# Patient Record
Sex: FEMALE | Race: WHITE | NOT HISPANIC OR LATINO | Employment: OTHER | URBAN - METROPOLITAN AREA
[De-identification: names, ages, dates, MRNs, and addresses within clinical notes are randomized per-mention and may not be internally consistent; named-entity substitution may affect disease eponyms.]

---

## 2018-12-03 ENCOUNTER — HOSPITAL ENCOUNTER (EMERGENCY)
Facility: CLINIC | Age: 83
Discharge: HOME | End: 2018-12-03
Attending: EMERGENCY MEDICINE | Admitting: EMERGENCY MEDICINE
Payer: MEDICARE

## 2018-12-03 ENCOUNTER — APPOINTMENT (OUTPATIENT)
Dept: MRI IMAGING | Facility: CLINIC | Age: 83
End: 2018-12-03
Attending: EMERGENCY MEDICINE
Payer: MEDICARE

## 2018-12-03 VITALS
BODY MASS INDEX: 17.66 KG/M2 | OXYGEN SATURATION: 96 % | HEIGHT: 65 IN | DIASTOLIC BLOOD PRESSURE: 114 MMHG | RESPIRATION RATE: 15 BRPM | SYSTOLIC BLOOD PRESSURE: 172 MMHG | WEIGHT: 106 LBS | TEMPERATURE: 98 F

## 2018-12-03 DIAGNOSIS — R20.2 TINGLING: ICD-10-CM

## 2018-12-03 DIAGNOSIS — N39.0 URINARY TRACT INFECTION WITHOUT HEMATURIA, SITE UNSPECIFIED: ICD-10-CM

## 2018-12-03 LAB
ALBUMIN UR-MCNC: 10 MG/DL
ANION GAP SERPL CALCULATED.3IONS-SCNC: 5 MMOL/L (ref 3–14)
APPEARANCE UR: CLEAR
BASOPHILS # BLD AUTO: 0 10E9/L (ref 0–0.2)
BASOPHILS NFR BLD AUTO: 0.7 %
BILIRUB UR QL STRIP: NEGATIVE
BUN SERPL-MCNC: 15 MG/DL (ref 7–30)
CALCIUM SERPL-MCNC: 8.9 MG/DL (ref 8.5–10.1)
CHLORIDE SERPL-SCNC: 107 MMOL/L (ref 94–109)
CO2 SERPL-SCNC: 28 MMOL/L (ref 20–32)
COLOR UR AUTO: YELLOW
CREAT SERPL-MCNC: 0.67 MG/DL (ref 0.52–1.04)
DIFFERENTIAL METHOD BLD: NORMAL
EOSINOPHIL # BLD AUTO: 0.1 10E9/L (ref 0–0.7)
EOSINOPHIL NFR BLD AUTO: 1.8 %
ERYTHROCYTE [DISTWIDTH] IN BLOOD BY AUTOMATED COUNT: 14 % (ref 10–15)
GFR SERPL CREATININE-BSD FRML MDRD: 84 ML/MIN/1.7M2
GLUCOSE SERPL-MCNC: 102 MG/DL (ref 70–99)
GLUCOSE UR STRIP-MCNC: NEGATIVE MG/DL
HCT VFR BLD AUTO: 38.2 % (ref 35–47)
HGB BLD-MCNC: 12.8 G/DL (ref 11.7–15.7)
HGB UR QL STRIP: NEGATIVE
IMM GRANULOCYTES # BLD: 0 10E9/L (ref 0–0.4)
IMM GRANULOCYTES NFR BLD: 0 %
INTERPRETATION ECG - MUSE: NORMAL
KETONES UR STRIP-MCNC: 10 MG/DL
LEUKOCYTE ESTERASE UR QL STRIP: ABNORMAL
LYMPHOCYTES # BLD AUTO: 1 10E9/L (ref 0.8–5.3)
LYMPHOCYTES NFR BLD AUTO: 22.9 %
MCH RBC QN AUTO: 31.1 PG (ref 26.5–33)
MCHC RBC AUTO-ENTMCNC: 33.5 G/DL (ref 31.5–36.5)
MCV RBC AUTO: 93 FL (ref 78–100)
MONOCYTES # BLD AUTO: 0.4 10E9/L (ref 0–1.3)
MONOCYTES NFR BLD AUTO: 9.3 %
MUCOUS THREADS #/AREA URNS LPF: PRESENT /LPF
NEUTROPHILS # BLD AUTO: 2.9 10E9/L (ref 1.6–8.3)
NEUTROPHILS NFR BLD AUTO: 65.3 %
NITRATE UR QL: NEGATIVE
NRBC # BLD AUTO: 0 10*3/UL
NRBC BLD AUTO-RTO: 0 /100
PH UR STRIP: 6.5 PH (ref 5–7)
PLATELET # BLD AUTO: 212 10E9/L (ref 150–450)
POTASSIUM SERPL-SCNC: 3.8 MMOL/L (ref 3.4–5.3)
RBC # BLD AUTO: 4.11 10E12/L (ref 3.8–5.2)
RBC #/AREA URNS AUTO: 8 /HPF (ref 0–2)
SODIUM SERPL-SCNC: 140 MMOL/L (ref 133–144)
SOURCE: ABNORMAL
SP GR UR STRIP: 1.02 (ref 1–1.03)
SQUAMOUS #/AREA URNS AUTO: <1 /HPF (ref 0–1)
UROBILINOGEN UR STRIP-MCNC: NORMAL MG/DL (ref 0–2)
WBC # BLD AUTO: 4.4 10E9/L (ref 4–11)
WBC #/AREA URNS AUTO: 40 /HPF (ref 0–5)

## 2018-12-03 PROCEDURE — 70553 MRI BRAIN STEM W/O & W/DYE: CPT

## 2018-12-03 PROCEDURE — A9585 GADOBUTROL INJECTION: HCPCS | Performed by: EMERGENCY MEDICINE

## 2018-12-03 PROCEDURE — 81001 URINALYSIS AUTO W/SCOPE: CPT | Performed by: EMERGENCY MEDICINE

## 2018-12-03 PROCEDURE — 93005 ELECTROCARDIOGRAM TRACING: CPT

## 2018-12-03 PROCEDURE — 85025 COMPLETE CBC W/AUTO DIFF WBC: CPT | Performed by: EMERGENCY MEDICINE

## 2018-12-03 PROCEDURE — 25500064 ZZH RX 255 OP 636: Performed by: EMERGENCY MEDICINE

## 2018-12-03 PROCEDURE — 96374 THER/PROPH/DIAG INJ IV PUSH: CPT

## 2018-12-03 PROCEDURE — 25000128 H RX IP 250 OP 636: Performed by: EMERGENCY MEDICINE

## 2018-12-03 PROCEDURE — 99285 EMERGENCY DEPT VISIT HI MDM: CPT | Mod: 25

## 2018-12-03 PROCEDURE — 80048 BASIC METABOLIC PNL TOTAL CA: CPT | Performed by: EMERGENCY MEDICINE

## 2018-12-03 RX ORDER — GADOBUTROL 604.72 MG/ML
4 INJECTION INTRAVENOUS ONCE
Status: COMPLETED | OUTPATIENT
Start: 2018-12-03 | End: 2018-12-03

## 2018-12-03 RX ORDER — CEPHALEXIN 500 MG/1
500 CAPSULE ORAL 3 TIMES DAILY
Qty: 30 CAPSULE | Refills: 0 | Status: SHIPPED | OUTPATIENT
Start: 2018-12-03 | End: 2018-12-13

## 2018-12-03 RX ORDER — LORAZEPAM 2 MG/ML
0.5 INJECTION INTRAMUSCULAR ONCE
Status: COMPLETED | OUTPATIENT
Start: 2018-12-03 | End: 2018-12-03

## 2018-12-03 RX ADMIN — LORAZEPAM 0.5 MG: 2 INJECTION INTRAMUSCULAR; INTRAVENOUS at 11:04

## 2018-12-03 RX ADMIN — GADOBUTROL 4 ML: 604.72 INJECTION INTRAVENOUS at 15:14

## 2018-12-03 ASSESSMENT — ENCOUNTER SYMPTOMS
FATIGUE: 1
NERVOUS/ANXIOUS: 1
NUMBNESS: 1

## 2018-12-03 NOTE — DISCHARGE INSTRUCTIONS
"  Paraesthesias  Paraesthesia is a burning or prickling sensation that is sometimes felt in the hands, arms, legs or feet. It can also occur in other parts of the body. It can also feel like tingling or numbness, skin crawling, or itching. The feeling is not comfortable, but it is not painful. (The \"pins and needles\" feeling that happens when a foot or hand \"falls asleep\" is a temporary paraesthesia.)  Paraesthesias that last or come and go may be caused by medical issues that need to be treated. These include stroke, a bulging disk pressing on a nerve, a trapped nerve, vitamin deficiencies, uncontrolled diabetes, alcohol abuse, or even certain medicines.  Tests are often done. These tests may include blood tests, X-ray, CT (computerized tomography) scan, nerve conduction studies (NCS), or a muscle test (electromyography). Depending on the cause, treatment may include physical therapy.  Home care    Tell your healthcare provider about all medicines you take. This includes prescription and over-the-counter medicines, vitamins, and herbs. Ask if any of the medicines may be causing your problems. Don't make any changes to prescription medicines without talking to your healthcare provider first.    You may be prescribed medicines to help relieve the tingling feeling or for pain. Take all medicines as directed.    A numb hand or foot may be more prone to injury. To help protect it:  ? Always use oven mitts.  ? Test water with an unaffected hand or foot.  ? Use caution when trimming nails. File sharp areas.  ? Wear shoes that fit well to avoid pressure points, blisters, and ulcers.  ? Inspect your hands and feet carefully (including the soles of your feet and between your toes) daily. If you see red areas, sores, or other problems, tell your healthcare provider.  Follow-up care  Follow up with your doctor, or as advised. You may need further testing or evaluation.     When to seek medical advice  Call your healthcare " provider right away if any of the following occur:    Numbness or weakness of the face, one arm, or one leg    Slurred speech, confusion, trouble speaking, walking, or seeing    Severe headache, fainting spell, dizziness, or seizure    Chest, arm, neck, or upper back pain    Loss of bladder or bowel control    Open wound with redness, swelling, or pus         Date Last Reviewed: 4/1/2018 2000-2018 [employer]. [address] [place] [address]. All rights reserved. This information is not intended as a substitute for professional medical care. Always follow your healthcare professional's instructions.

## 2018-12-03 NOTE — ED AVS SNAPSHOT
Emergency Department    [address] [place] [address]    Phone:  [phone]    Fax:  [phone]                                       Kavita Navarro   MRN: [id number]    Department:   Emergency Department   Date of Visit:  12/3/2018           After Visit Summary Signature Page     I have received my discharge instructions, and my questions have been answered. I have discussed any challenges I see with this plan with the nurse or doctor.    ..........................................................................................................................................  Patient/Patient Representative Signature      ..........................................................................................................................................  Patient Representative Print Name and Relationship to Patient    ..................................................               ................................................  Date                                   Time    ..........................................................................................................................................  Reviewed by Signature/Title    ...................................................              ..............................................  Date                                               Time          22EPIC Rev 08/18

## 2018-12-03 NOTE — ED AVS SNAPSHOT
"  Emergency Department    [address] [facility] [address]    Phone:  [phone]    Fax:  [phone]                                       Kavita Navarro   MRN: [id number]    Department:   Emergency Department   Date of Visit:  12/3/2018           Patient Information     Date Of Birth          1935        Your diagnoses for this visit were:     Urinary tract infection without hematuria, site unspecified     Tingling        You were seen by Kevin Milton MD.      Follow-up Information     Follow up with Radha Gonzales MD. Schedule an appointment as soon as possible for a visit in 3 days.    Why:  As needed, For repeat evaluation and symptom check    Contact information:    [facility]  [address]     [facility] [address]  [phone]          Follow up with  Emergency Department.    Specialty:  EMERGENCY MEDICINE    Why:  If symptoms worsen    Contact information:    [address] [facility] [address] [phone]        Discharge Instructions         Paraesthesias  Paraesthesia is a burning or prickling sensation that is sometimes felt in the hands, arms, legs or feet. It can also occur in other parts of the body. It can also feel like tingling or numbness, skin crawling, or itching. The feeling is not comfortable, but it is not painful. (The \"pins and needles\" feeling that happens when a foot or hand \"falls asleep\" is a temporary paraesthesia.)  Paraesthesias that last or come and go may be caused by medical issues that need to be treated. These include stroke, a bulging disk pressing on a nerve, a trapped nerve, vitamin deficiencies, uncontrolled diabetes, alcohol abuse, or even certain medicines.  Tests are often done. These tests may include blood tests, X-ray, CT (computerized tomography) scan, nerve conduction studies (NCS), or a muscle test (electromyography). Depending on the cause, treatment may include " physical therapy.  Home care    Tell your healthcare provider about all medicines you take. This includes prescription and over-the-counter medicines, vitamins, and herbs. Ask if any of the medicines may be causing your problems. Don't make any changes to prescription medicines without talking to your healthcare provider first.    You may be prescribed medicines to help relieve the tingling feeling or for pain. Take all medicines as directed.    A numb hand or foot may be more prone to injury. To help protect it:  ? Always use oven mitts.  ? Test water with an unaffected hand or foot.  ? Use caution when trimming nails. File sharp areas.  ? Wear shoes that fit well to avoid pressure points, blisters, and ulcers.  ? Inspect your hands and feet carefully (including the soles of your feet and between your toes) daily. If you see red areas, sores, or other problems, tell your healthcare provider.  Follow-up care  Follow up with your doctor, or as advised. You may need further testing or evaluation.     When to seek medical advice  Call your healthcare provider right away if any of the following occur:    Numbness or weakness of the face, one arm, or one leg    Slurred speech, confusion, trouble speaking, walking, or seeing    Severe headache, fainting spell, dizziness, or seizure    Chest, arm, neck, or upper back pain    Loss of bladder or bowel control    Open wound with redness, swelling, or pus         Date Last Reviewed: 4/1/2018 2000-2018 [employer]. [address] [place] [address]. All rights reserved. This information is not intended as a substitute for professional medical care. Always follow your healthcare professional's instructions.          Discharge References/Attachments     URINARY TRACT INFECTIONS IN WOMEN (ENGLISH)      24 Hour Appointment Hotline       To make an appointment at any [facility] call [phone] ([phone]). If you don't have a family doctor or  clinic, we will help you find one. [facility] are conveniently located to serve the needs of you and your family.             Review of your medicines      START taking        Dose / Directions Last dose taken    cephALEXin 500 MG capsule   Commonly known as:  KEFLEX   Dose:  500 mg   Quantity:  30 capsule        Take 1 capsule (500 mg) by mouth 3 times daily for 10 days   Refills:  0                Prescriptions were sent or printed at these locations (1 Prescription)                   Other Prescriptions                Printed at Department/Unit printer (1 of 1)         cephALEXin (KEFLEX) 500 MG capsule                Procedures and tests performed during your visit     Basic metabolic panel    CBC with platelets differential    EKG 12-lead, tracing only    MR Brain w/o & w Contrast    UA with Microscopic      Orders Needing Specimen Collection     None      Pending Results     Date and Time Order Name Status Description    12/3/2018 1051 MR Brain w/o & w Contrast Preliminary             Pending Culture Results     No orders found from 12/1/2018 to 12/4/2018.            Pending Results Instructions     If you had any lab results that were not finalized at the time of your Discharge, you can call the ED Lab Result RN at [phone]. You will be contacted by this team for any positive Lab results or changes in treatment. The nurses are available 7 days a week from 10A to 6:30P.  You can leave a message 24 hours per day and they will return your call.        Test Results From Your Hospital Stay        12/3/2018 10:47 AM      Component Results     Component Value Ref Range & Units Status    Color Urine Yellow  Final    Appearance Urine Clear  Final    Glucose Urine Negative NEG^Negative mg/dL Final    Bilirubin Urine Negative NEG^Negative Final    Ketones Urine 10 (A) NEG^Negative mg/dL Final    Specific Gravity Urine 1.018 1.003 - 1.035 Final    Blood Urine Negative NEG^Negative Final    pH Urine 6.5 5.0 -  7.0 pH Final    Protein Albumin Urine 10 (A) NEG^Negative mg/dL Final    Urobilinogen mg/dL Normal 0.0 - 2.0 mg/dL Final    Nitrite Urine Negative NEG^Negative Final    Leukocyte Esterase Urine Large (A) NEG^Negative Final    Source Midstream Urine  Final    WBC Urine 40 (H) 0 - 5 /HPF Final    RBC Urine 8 (H) 0 - 2 /HPF Final    Squamous Epithelial /HPF Urine <1 0 - 1 /HPF Final    Mucous Urine Present (A) NEG^Negative /LPF Final         12/3/2018 11:17 AM      Component Results     Component Value Ref Range & Units Status    WBC 4.4 4.0 - 11.0 10e9/L Final    RBC Count 4.11 3.8 - 5.2 10e12/L Final    Hemoglobin 12.8 11.7 - 15.7 g/dL Final    Hematocrit 38.2 35.0 - 47.0 % Final    MCV 93 78 - 100 fl Final    MCH 31.1 26.5 - 33.0 pg Final    MCHC 33.5 31.5 - 36.5 g/dL Final    RDW 14.0 10.0 - 15.0 % Final    Platelet Count 212 150 - 450 10e9/L Final    Diff Method Automated Method  Final    % Neutrophils 65.3 % Final    % Lymphocytes 22.9 % Final    % Monocytes 9.3 % Final    % Eosinophils 1.8 % Final    % Basophils 0.7 % Final    % Immature Granulocytes 0.0 % Final    Nucleated RBCs 0 0 /100 Final    Absolute Neutrophil 2.9 1.6 - 8.3 10e9/L Final    Absolute Lymphocytes 1.0 0.8 - 5.3 10e9/L Final    Absolute Monocytes 0.4 0.0 - 1.3 10e9/L Final    Absolute Eosinophils 0.1 0.0 - 0.7 10e9/L Final    Absolute Basophils 0.0 0.0 - 0.2 10e9/L Final    Abs Immature Granulocytes 0.0 0 - 0.4 10e9/L Final    Absolute Nucleated RBC 0.0  Final         12/3/2018 11:34 AM      Component Results     Component Value Ref Range & Units Status    Sodium 140 133 - 144 mmol/L Final    Potassium 3.8 3.4 - 5.3 mmol/L Final    Chloride 107 94 - 109 mmol/L Final    Carbon Dioxide 28 20 - 32 mmol/L Final    Anion Gap 5 3 - 14 mmol/L Final    Glucose 102 (H) 70 - 99 mg/dL Final    Urea Nitrogen 15 7 - 30 mg/dL Final    Creatinine 0.67 0.52 - 1.04 mg/dL Final    GFR Estimate 84 >60 mL/min/1.7m2 Final    Non  GFR Calc    GFR  Estimate If Black >90 >60 mL/min/1.7m2 Final    African American GFR Calc    Calcium 8.9 8.5 - 10.1 mg/dL Final         12/3/2018  3:22 PM      Narrative     MRI BRAIN WITHOUT AND WITH CONTRAST  12/3/2018 3:02 PM    HISTORY:  Possible TIA vs CVA several days ago. Tingling and  paresthesias in bilateral upper extremities.    TECHNIQUE:  Multiplanar, multisequence MRI of the brain without and  with 4 mL Gadavist.    COMPARISON: None.    FINDINGS: Diffusion-weighted images are normal. There is no evidence  for intracranial hemorrhage or acute infarct. There is some mild to  moderate cerebral atrophy. The brain parenchyma is otherwise within  normal limits. Vascular structures are patent at the skull base.  Postcontrast images do not show any abnormal areas of enhancement or  any focal mass lesions.        Impression     IMPRESSION:  1. Mild to moderate cerebral atrophy.  2. No evidence for intracranial hemorrhage, acute infarct, any focal  mass lesions.                Clinical Quality Measure: Blood Pressure Screening     Your blood pressure was checked while you were in the emergency department today. The last reading we obtained was  BP: 140/71 . Please read the guidelines below about what these numbers mean and what you should do about them.  If your systolic blood pressure (the top number) is less than 120 and your diastolic blood pressure (the bottom number) is less than 80, then your blood pressure is normal. There is nothing more that you need to do about it.  If your systolic blood pressure (the top number) is 120-139 or your diastolic blood pressure (the bottom number) is 80-89, your blood pressure may be higher than it should be. You should have your blood pressure rechecked within a year by a primary care provider.  If your systolic blood pressure (the top number) is 140 or greater or your diastolic blood pressure (the bottom number) is 90 or greater, you may have high blood pressure. High blood pressure is  "treatable, but if left untreated over time it can put you at risk for heart attack, stroke, or kidney failure. You should have your blood pressure rechecked by a primary care provider within the next 4 weeks.  If your provider in the emergency department today gave you specific instructions to follow-up with your doctor or provider even sooner than that, you should follow that instruction and not wait for up to 4 weeks for your follow-up visit.        Thank you for choosing [facility]       Thank you for choosing [facility] for your care. Our goal is always to provide you with excellent care. Hearing back from our patients is one way we can continue to improve our services. Please take a few minutes to complete the written survey that you may receive in the mail after you visit with us. Thank you!        [employer] Information     Whois lets you send messages to your doctor, view your test results, renew your prescriptions, schedule appointments and more. To sign up, go to [email or web address] . Click on \"Log in\" on the left side of the screen, which will take you to the Welcome page. Then click on \"Sign up Now\" on the right side of the page.     You will be asked to enter the access code listed below, as well as some personal information. Please follow the directions to create your username and password.     Your access code is: [email or web address]  Expires: 3/3/2019  3:27 PM     Your access code will  in 90 days. If you need help or a new code, please call your [facility] clinic or [phone].        Care EveryWhere ID     This is your Care EveryWhere ID. This could be used by other organizations to access your [facility] medical records  [id number]        Equal Access to Services     NAY NERI : Marilyn Gupta, enrrique campbell, elina azevedo. So [facility] [phone].    ATENCIÓN: Si habla español, tiene a davila disposición servicios gratuitos de " asistencia lingüística. Joel al [phone].    We comply with applicable federal civil rights laws and [place] laws. We do not discriminate on the basis of race, color, national origin, age, disability, sex, sexual orientation, or gender identity.            After Visit Summary       This is your record. Keep this with you and show to your community pharmacist(s) and doctor(s) at your next visit.

## 2018-12-03 NOTE — ED PROVIDER NOTES
"  History     Chief Complaint:  Tingling    HPI   Kavita Navarro is a 83 year old female with a history of cancer who presents to the ED for evaluation of tingling. The patient reports that she has had her left hand \"lock up\" intermittently over the past week. 2 days ago, she developed an onset of tingling in her right hand, in addition to tingling in the right face, so vshe presented to the ED for evaluation. Here in the ED, she does note some fatigue. She denies any other symptoms or concerns. She does state that she is very anxious about this, as her father had a stroke.    Allergies:  NKDA    Medications:    The patient is currently on no regular medications.    Past Medical History:    Cancer  Hepatitis A    Past Surgical History:    Cataract   section    Family History:    Stroke    Social History:  Former Smoker  Alcohol use: yes    Review of Systems   Constitutional: Positive for fatigue.   Neurological: Positive for numbness (tingling).   Psychiatric/Behavioral: The patient is nervous/anxious.    All other systems reviewed and are negative.      Physical Exam     Patient Vitals for the past 24 hrs:   BP Temp Temp src Heart Rate Resp SpO2 Height Weight   18 1400 140/71 - - 64 14 98 % - -   18 1300 (!) 150/91 - - 68 16 97 % - -   18 1200 (!) 160/95 - - 67 14 95 % - -   18 1054 (!) 163/102 - - 78 18 98 % - -   18 1016 (!) 159/101 98  F (36.7  C) Oral 83 16 98 % 1.651 m (5' 5\") 48.1 kg (106 lb)     Physical Exam  Vitals: reviewed by me  General: Pt seen on [facility], cooperative, and alert to conversation  Eyes: Tracking well, clear conjunctiva BL  ENT: MMM, midline trachea.   Lungs:  No tachypnea, no accessory muscle use. No respiratory distress.   CV: Rate as above, regular rhythm.    Abd: Soft, non tender, no guarding, no rebound. Non distended  MSK: no peripheral edema or joint effusion.  No evidence of trauma  Skin: No rash, normal turgor and " temperature  Neuro: Clear speech and no facial droop.  BUE and BLE with SILT and 5/5 motor  CN 2 - 12 in tact   Psych: Not RIS, no e/o AH/VH      Emergency Department Course     ECG:  Indication: Tingling  Time: 1059  Vent. Rate 75 bpm. CA interval 142. QRS duration 88. QT/QTc 406/453. P-R-T axis 84 -19 82. Normal sinus rhythm. Possible left atrial enlargement. Borderline ECG. Read time: 1100    Imaging:  Radiographic findings were communicated with the patient who voiced understanding of the findings.  MR Brain w/o and w/ contrast:   1. Mild to moderate cerebral atrophy.  2. No evidence for intracranial hemorrhage, acute infarct, any focal mass lesions, as per radiology    Laboratory:  CBC: WBC: 4.4, HGB: 12.8, PLT: 212  BMP: Glucose 102 (H), o/w WNL (Creatinine: 0.67)    UA with Microscopic: protein albumin 10 (A), ketones 10 (A), leukocyte esterase large (A), WBC 40 (H), RBC 8 (H), mucous present (A), o/w WNL    Interventions:  1104 Lorazepam, 0.5 mg, IV injection  1514 Gadavist 4 mL IV    Emergency Department Course:  Nursing notes and vitals reviewed. (2087) I performed an exam of the patient as documented above.     IV inserted. Medicine administered as documented above. Blood drawn. This was sent to the lab for further testing, results above.    The patient provided a urine sample here in the emergency department. This was sent for laboratory testing, findings above.     EKG obtained in the ED, see results above.     The patient was sent for a Brain MR while in the emergency department, findings above.     (1530) I rechecked the patient and discussed the results of her workup thus far.     Findings and plan explained to the Patient. Patient discharged home with instructions regarding supportive care, medications, and reasons to return. The importance of close follow-up was reviewed. The patient was prescribed Keflex.    I personally reviewed the laboratory results with the Patient and answered all related  questions prior to discharge.     Impression & Plan      Medical Decision Making:  Kavita Navarro is a 83 year old female who presents to the ED with paraesthesias and generalized fatigue. Reassuringly, her MRI is negative, and this was done out of caution given paraesthesias as well as her age. However, there is no evidence of CVA. She does have an abnormal UA. I do think this explains some of her fatigue and I do think this is worth treating. The patient agrees with this plan as well. Labs are otherwise unremarkable, with no indication with IV antibiotics as she should do well with Keflex. The family is okay with this plan as well (son at bedside). Very clear return to ED precautions were discussed. No other abnormalities noted at this time.    Diagnosis:    ICD-10-CM    1. Urinary tract infection without hematuria, site unspecified N39.0    2. Tingling R20.2      Disposition:  discharged to home    Discharge Medications:  New Prescriptions    CEPHALEXIN (KEFLEX) 500 MG CAPSULE    Take 1 capsule (500 mg) by mouth 3 times daily for 10 days     Scribe Disclosure:  I, Ирина Floyd, am serving as a scribe on 12/3/2018 at 10:42 AM to personally document services performed by Kevin Milton MD based on my observations and the provider's statements to me.     Ирина Floyd  12/3/2018    EMERGENCY DEPARTMENT       Kevin Milton MD  12/03/18 1669

## 2020-11-23 ENCOUNTER — APPOINTMENT (OUTPATIENT)
Dept: ULTRASOUND IMAGING | Facility: CLINIC | Age: 85
End: 2020-11-23
Attending: EMERGENCY MEDICINE
Payer: MEDICARE

## 2020-11-23 ENCOUNTER — HOSPITAL ENCOUNTER (EMERGENCY)
Facility: CLINIC | Age: 85
Discharge: HOME | End: 2020-11-23
Attending: EMERGENCY MEDICINE | Admitting: EMERGENCY MEDICINE
Payer: MEDICARE

## 2020-11-23 VITALS
SYSTOLIC BLOOD PRESSURE: 155 MMHG | BODY MASS INDEX: 16.66 KG/M2 | OXYGEN SATURATION: 98 % | WEIGHT: 100 LBS | DIASTOLIC BLOOD PRESSURE: 103 MMHG | HEART RATE: 72 BPM | RESPIRATION RATE: 20 BRPM | TEMPERATURE: 98 F | HEIGHT: 65 IN

## 2020-11-23 DIAGNOSIS — R26.2 TROUBLE WALKING: ICD-10-CM

## 2020-11-23 DIAGNOSIS — R53.81 PHYSICAL DECONDITIONING: ICD-10-CM

## 2020-11-23 LAB
ALBUMIN SERPL-MCNC: 3.4 G/DL (ref 3.4–5)
ALP SERPL-CCNC: 62 U/L (ref 40–150)
ALT SERPL W P-5'-P-CCNC: 23 U/L (ref 0–50)
ANION GAP SERPL CALCULATED.3IONS-SCNC: 5 MMOL/L (ref 3–14)
AST SERPL W P-5'-P-CCNC: 24 U/L (ref 0–45)
BASOPHILS # BLD AUTO: 0 10E9/L (ref 0–0.2)
BASOPHILS NFR BLD AUTO: 0.5 %
BILIRUB SERPL-MCNC: 0.2 MG/DL (ref 0.2–1.3)
BUN SERPL-MCNC: 17 MG/DL (ref 7–30)
CALCIUM SERPL-MCNC: 9 MG/DL (ref 8.5–10.1)
CHLORIDE SERPL-SCNC: 106 MMOL/L (ref 94–109)
CO2 SERPL-SCNC: 26 MMOL/L (ref 20–32)
CREAT SERPL-MCNC: 0.67 MG/DL (ref 0.52–1.04)
DIFFERENTIAL METHOD BLD: NORMAL
EOSINOPHIL # BLD AUTO: 0 10E9/L (ref 0–0.7)
EOSINOPHIL NFR BLD AUTO: 0.3 %
ERYTHROCYTE [DISTWIDTH] IN BLOOD BY AUTOMATED COUNT: 13.8 % (ref 10–15)
GFR SERPL CREATININE-BSD FRML MDRD: 81 ML/MIN/{1.73_M2}
GLUCOSE SERPL-MCNC: 169 MG/DL (ref 70–99)
HCT VFR BLD AUTO: 38.3 % (ref 35–47)
HGB BLD-MCNC: 13 G/DL (ref 11.7–15.7)
IMM GRANULOCYTES # BLD: 0 10E9/L (ref 0–0.4)
IMM GRANULOCYTES NFR BLD: 0 %
INTERPRETATION ECG - MUSE: NORMAL
LACTATE BLD-SCNC: 1.9 MMOL/L (ref 0.7–2)
LYMPHOCYTES # BLD AUTO: 1 10E9/L (ref 0.8–5.3)
LYMPHOCYTES NFR BLD AUTO: 16.4 %
MCH RBC QN AUTO: 32.2 PG (ref 26.5–33)
MCHC RBC AUTO-ENTMCNC: 33.9 G/DL (ref 31.5–36.5)
MCV RBC AUTO: 95 FL (ref 78–100)
MONOCYTES # BLD AUTO: 0.6 10E9/L (ref 0–1.3)
MONOCYTES NFR BLD AUTO: 9.5 %
NEUTROPHILS # BLD AUTO: 4.4 10E9/L (ref 1.6–8.3)
NEUTROPHILS NFR BLD AUTO: 73.3 %
NRBC # BLD AUTO: 0 10*3/UL
NRBC BLD AUTO-RTO: 0 /100
PLATELET # BLD AUTO: 249 10E9/L (ref 150–450)
POTASSIUM SERPL-SCNC: 3.9 MMOL/L (ref 3.4–5.3)
PROT SERPL-MCNC: 7.1 G/DL (ref 6.8–8.8)
RBC # BLD AUTO: 4.04 10E12/L (ref 3.8–5.2)
SODIUM SERPL-SCNC: 137 MMOL/L (ref 133–144)
TROPONIN I SERPL-MCNC: <0.015 UG/L (ref 0–0.04)
WBC # BLD AUTO: 6 10E9/L (ref 4–11)

## 2020-11-23 PROCEDURE — 84484 ASSAY OF TROPONIN QUANT: CPT | Performed by: EMERGENCY MEDICINE

## 2020-11-23 PROCEDURE — 96360 HYDRATION IV INFUSION INIT: CPT

## 2020-11-23 PROCEDURE — 93005 ELECTROCARDIOGRAM TRACING: CPT

## 2020-11-23 PROCEDURE — 80053 COMPREHEN METABOLIC PANEL: CPT | Performed by: EMERGENCY MEDICINE

## 2020-11-23 PROCEDURE — 83605 ASSAY OF LACTIC ACID: CPT | Performed by: EMERGENCY MEDICINE

## 2020-11-23 PROCEDURE — 85025 COMPLETE CBC W/AUTO DIFF WBC: CPT | Performed by: EMERGENCY MEDICINE

## 2020-11-23 PROCEDURE — 93922 UPR/L XTREMITY ART 2 LEVELS: CPT | Mod: GZ

## 2020-11-23 PROCEDURE — 258N000003 HC RX IP 258 OP 636: Performed by: EMERGENCY MEDICINE

## 2020-11-23 PROCEDURE — 93925 LOWER EXTREMITY STUDY: CPT

## 2020-11-23 PROCEDURE — 99285 EMERGENCY DEPT VISIT HI MDM: CPT | Mod: 25

## 2020-11-23 RX ADMIN — SODIUM CHLORIDE 1000 ML: 9 INJECTION, SOLUTION INTRAVENOUS at 11:16

## 2020-11-23 ASSESSMENT — ENCOUNTER SYMPTOMS
WEAKNESS: 1
BACK PAIN: 0

## 2020-11-23 ASSESSMENT — MIFFLIN-ST. JEOR: SCORE: 904.48

## 2020-11-23 NOTE — ED AVS SNAPSHOT
[facility] Emergency Dept  [address] [facility] [address]  Phone: [phone]  Fax: [phone]                                    Kavita Navarro   MRN: [id number]    Department: [facility] Emergency Dept   Date of Visit: 11/23/2020           After Visit Summary Signature Page    I have received my discharge instructions, and my questions have been answered. I have discussed any challenges I see with this plan with the nurse or doctor.    ..........................................................................................................................................  Patient/Patient Representative Signature      ..........................................................................................................................................  Patient Representative Print Name and Relationship to Patient    ..................................................               ................................................  Date                                   Time    ..........................................................................................................................................  Reviewed by Signature/Title    ...................................................              ..............................................  Date                                               Time          22EPIC Rev 08/18

## 2020-11-23 NOTE — DISCHARGE INSTRUCTIONS
As we discussed, I do think that part of the reason you are having trouble walking is because you are exercising less.  Please do try and do additional exercises around the house, and it may be worth mentioning to your family that you miss having a treadmill accessible.  Come back to the emergency room with any other concerns.

## 2020-11-23 NOTE — ED PROVIDER NOTES
"  History     Chief Complaint:  Gait Problem    The history is provided by the patient.      Kavita Navarro is a 85 year old female who presents with a gait problem. For several months, she has had right leg weakness. For several weeks, she endorses left leg weakness. Last night she felt more weak than usual so she did some mild exercise and stretched which mildly relieved her symptoms. Last nigh at approximately 0200, she woke up to go to the bathroom when she noticed leg weakness and shaking.     Allergies:  Latex  Niacin     Medications:    Miralax  Lipitor    Past Medical History:  Generalized Anxiety    Skin Cancer  Breast Cancer  Hepatitis A  Urinary Inconstancy  FELTON  Osteopenia  Hyperlipidemia     Past Surgical History:     x2  Cataract Removal   Patellar Bursectomy  EGD  Dilation and Curettage  Breast Lumpectomy  Blepharoplasty     Family History:    Father - Stoke  Brother - Diabetes   Sister - Diabetes, Lung Cancer, Alzheimers    Social History:  The patient was unaccompanied to the ED.  Smoking Status: Former Smoker  Smokeless Tobacco: Never Used  Alcohol Use: Positive - Occasionally   Marital Status:        Review of Systems   Musculoskeletal: Negative for back pain.   Neurological: Positive for weakness (Bilateral Leg Weakness).   All other systems reviewed and are negative.      Physical Exam     Patient Vitals for the past 24 hrs:   BP Temp Temp src Pulse Resp SpO2 Height Weight   20 1340 (!) 155/103 -- -- 72 20 98 % -- --   20 1019 (!) 158/87 98  F (36.7  C) Oral 104 20 97 % 1.651 m (5' 5\") 45.4 kg (100 lb)       Physical Exam  Vitals: reviewed by me  General: Pt seen on [facility], cooperative, and alert to conversation, slightly anxious.  Eyes: Tracking well, clear conjunctiva BL  ENT: MMM, midline trachea.   Lungs:   No tachypnea, no accessory muscle use. No respiratory distress.   CV: Rate as above, regular rhythm.    Abd: Soft, non tender, no " guarding, no rebound. Non distended  MSK: no peripheral edema or joint effusion.  No evidence of trauma  Bilateral lower extremities are warm and well perfused with robust pulses.  Skin: No rash, normal turgor and temperature  Neuro: Clear speech and no facial droop.  Bilateral lower extremities with sensation intact light touch and 5 out of 5 motor throughout.,  Specifically to hip flexion hip extension knee extension knee flexion dorsiflexion plantarflexion.  Psych: Not RIS, no e/o AH/VH      Emergency Department Course     ECG:  ECG taken at 1122, ECG read at 1129  Sinus rhythm with occasional premature ventricular complexes   Otherwise normal ECG   Rate 82 bpm. AL interval 146. QRS duration 88. QT/QTc 376/439. P-R-T axes 75 2 81.    Imaging:  Radiology findings were communicated with the patient who voiced understanding of the findings.    US PRIYA Doppler No Exercise:  Ankle-brachial indices are within normal limits. Findings  do not indicate significant lower extremity arterial insufficiency.  Reading per radiology    US Lower Extremity Arterial Duplex Bilateral:  No evidence for significant steno-occlusive disease  involving the infrainguinal arteries of the lower extremities.  Reading per radiology    Laboratory:  Laboratory findings were communicated with the patient who voiced understanding of the findings.    CBC: WBC 6.0, HGB 13.0,   CMP: Glucose 169 (H), o/w WNL (Creatinine: 0.67)    Lactic Acid: 1.9  Troponin (Collected 1114): <0.015     Interventions:  1116 0.9% NaCl bolus 1000 mL IV    Emergency Department Course:  Past medical records, nursing notes, and vitals reviewed.    1048 I performed an exam of the patient as documented above.     EKG obtained in the ED, see results above.   IV was inserted and blood was drawn for laboratory testing, results above.    The patient was sent for a US while in the emergency department, results above.     1308 I rechecked the patient and discussed the results  of her workup thus far.     Findings and plan explained to the Patient. Patient discharged home with instructions regarding supportive care, medications, and reasons to return. The importance of close follow-up was reviewed.     I personally reviewed the laboratory and imaging results with the Patient and answered all related questions prior to discharge.     Impression & Plan     Medical Decision Making:   Kavita Navarro is a 85 year old female who presents to the emergency room with a sensation that her legs are cold and weak for the last several weeks. I did reveal her previous medical visits and she was due to get an ultrasound done of her arterial flow for inflow disease of her bilateral lower extremities and have been able to do this here in the ER and shows no evidence of PAD or inflow disease. She has robust strength on my exam. She can ambulate strongly in the emergency room. After discussion I do believe that deconditioning is also playing a significant role in the patient's unsteadiness on her feet. She tells me that before the winter turned cold, and before corona virus got to be severe enough to keep away from the gym she was walking for about an hour a day every day, and now for the last several weeks she has not been walking nearly as much, not even leaving the home except to leave for the grocery store once a week. We talked about potential remedies for this. I do not think she has had a stroke, she has no shooting pain that would evidence spinal issue or rediculopathy, and has no back discomfort to speak of.  No skin abnormalities, no evidence of cellulitis or trauma. Patient feels comfortable going home with the above mgmt.   Diagnosis:    ICD-10-CM    1. Trouble walking  R26.2    2. Physical deconditioning  R53.81        Disposition:  Discharged to home.    Scribe Disclosure:  I, Greyson Peters, am serving as a scribe at 10:43 AM on 11/23/2020 to document services personally performed by  Kevin Milton* based on my observations and the provider's statements to me.        Kevin Milton MD  11/23/20 4721

## 2021-05-25 ENCOUNTER — ANCILLARY PROCEDURE (OUTPATIENT)
Dept: MAMMOGRAPHY | Facility: CLINIC | Age: 86
End: 2021-05-25
Attending: INTERNAL MEDICINE
Payer: MEDICARE

## 2021-05-25 DIAGNOSIS — Z12.31 VISIT FOR SCREENING MAMMOGRAM: ICD-10-CM

## 2021-05-25 PROCEDURE — 77067 SCR MAMMO BI INCL CAD: CPT | Mod: TC | Performed by: RADIOLOGY

## 2021-05-25 PROCEDURE — 77063 BREAST TOMOSYNTHESIS BI: CPT | Mod: TC | Performed by: RADIOLOGY

## 2022-04-22 ENCOUNTER — HOSPITAL ENCOUNTER (OUTPATIENT)
Facility: CLINIC | Age: 87
Setting detail: OBSERVATION
Discharge: HOME | End: 2022-04-23
Attending: EMERGENCY MEDICINE | Admitting: INTERNAL MEDICINE
Payer: MEDICARE

## 2022-04-22 ENCOUNTER — APPOINTMENT (OUTPATIENT)
Dept: CT IMAGING | Facility: CLINIC | Age: 87
End: 2022-04-22
Attending: EMERGENCY MEDICINE
Payer: MEDICARE

## 2022-04-22 DIAGNOSIS — R93.89 ABNORMAL CT OF THE CHEST: ICD-10-CM

## 2022-04-22 DIAGNOSIS — I26.99 OTHER ACUTE PULMONARY EMBOLISM WITHOUT ACUTE COR PULMONALE (H): ICD-10-CM

## 2022-04-22 LAB
ALBUMIN SERPL-MCNC: 3.3 G/DL (ref 3.4–5)
ALP SERPL-CCNC: 77 U/L (ref 40–150)
ALT SERPL W P-5'-P-CCNC: 29 U/L (ref 0–50)
ANION GAP SERPL CALCULATED.3IONS-SCNC: 4 MMOL/L (ref 3–14)
AST SERPL W P-5'-P-CCNC: 26 U/L (ref 0–45)
ATRIAL RATE - MUSE: 250 BPM
ATRIAL RATE - MUSE: 75 BPM
BASOPHILS # BLD AUTO: 0.1 10E3/UL (ref 0–0.2)
BASOPHILS NFR BLD AUTO: 1 %
BILIRUB SERPL-MCNC: 0.5 MG/DL (ref 0.2–1.3)
BUN SERPL-MCNC: 17 MG/DL (ref 7–30)
CALCIUM SERPL-MCNC: 9.4 MG/DL (ref 8.5–10.1)
CHLORIDE BLD-SCNC: 108 MMOL/L (ref 94–109)
CO2 SERPL-SCNC: 28 MMOL/L (ref 20–32)
CREAT SERPL-MCNC: 0.73 MG/DL (ref 0.52–1.04)
D DIMER PPP FEU-MCNC: 1.33 UG/ML FEU (ref 0–0.5)
DIASTOLIC BLOOD PRESSURE - MUSE: NORMAL MMHG
DIASTOLIC BLOOD PRESSURE - MUSE: NORMAL MMHG
EOSINOPHIL # BLD AUTO: 0.1 10E3/UL (ref 0–0.7)
EOSINOPHIL NFR BLD AUTO: 1 %
ERYTHROCYTE [DISTWIDTH] IN BLOOD BY AUTOMATED COUNT: 14 % (ref 10–15)
GFR SERPL CREATININE-BSD FRML MDRD: 80 ML/MIN/1.73M2
GLUCOSE BLD-MCNC: 97 MG/DL (ref 70–99)
HCT VFR BLD AUTO: 38.6 % (ref 35–47)
HGB BLD-MCNC: 12.2 G/DL (ref 11.7–15.7)
IMM GRANULOCYTES # BLD: 0 10E3/UL
IMM GRANULOCYTES NFR BLD: 0 %
INTERPRETATION ECG - MUSE: NORMAL
INTERPRETATION ECG - MUSE: NORMAL
LIPASE SERPL-CCNC: 73 U/L (ref 73–393)
LYMPHOCYTES # BLD AUTO: 1.4 10E3/UL (ref 0.8–5.3)
LYMPHOCYTES NFR BLD AUTO: 18 %
MCH RBC QN AUTO: 31.1 PG (ref 26.5–33)
MCHC RBC AUTO-ENTMCNC: 31.6 G/DL (ref 31.5–36.5)
MCV RBC AUTO: 99 FL (ref 78–100)
MONOCYTES # BLD AUTO: 0.9 10E3/UL (ref 0–1.3)
MONOCYTES NFR BLD AUTO: 11 %
NEUTROPHILS # BLD AUTO: 5.3 10E3/UL (ref 1.6–8.3)
NEUTROPHILS NFR BLD AUTO: 69 %
NRBC # BLD AUTO: 0 10E3/UL
NRBC BLD AUTO-RTO: 0 /100
P AXIS - MUSE: 81 DEGREES
P AXIS - MUSE: 86 DEGREES
PLATELET # BLD AUTO: 235 10E3/UL (ref 150–450)
POTASSIUM BLD-SCNC: 3.9 MMOL/L (ref 3.4–5.3)
PR INTERVAL - MUSE: 146 MS
PR INTERVAL - MUSE: NORMAL MS
PROCALCITONIN SERPL-MCNC: <0.05 NG/ML
PROT SERPL-MCNC: 7.5 G/DL (ref 6.8–8.8)
QRS DURATION - MUSE: 74 MS
QRS DURATION - MUSE: 86 MS
QT - MUSE: 402 MS
QT - MUSE: 412 MS
QTC - MUSE: 431 MS
QTC - MUSE: 448 MS
R AXIS - MUSE: 2 DEGREES
R AXIS - MUSE: 46 DEGREES
RADIOLOGIST FLAGS: ABNORMAL
RBC # BLD AUTO: 3.92 10E6/UL (ref 3.8–5.2)
SARS-COV-2 RNA RESP QL NAA+PROBE: NEGATIVE
SODIUM SERPL-SCNC: 140 MMOL/L (ref 133–144)
SYSTOLIC BLOOD PRESSURE - MUSE: NORMAL MMHG
SYSTOLIC BLOOD PRESSURE - MUSE: NORMAL MMHG
T AXIS - MUSE: 68 DEGREES
T AXIS - MUSE: 77 DEGREES
TROPONIN I SERPL HS-MCNC: 6 NG/L
VENTRICULAR RATE- MUSE: 66 BPM
VENTRICULAR RATE- MUSE: 75 BPM
WBC # BLD AUTO: 7.8 10E3/UL (ref 4–11)

## 2022-04-22 PROCEDURE — 96374 THER/PROPH/DIAG INJ IV PUSH: CPT

## 2022-04-22 PROCEDURE — 85379 FIBRIN DEGRADATION QUANT: CPT | Performed by: EMERGENCY MEDICINE

## 2022-04-22 PROCEDURE — 99220 PR INITIAL OBSERVATION CARE,LEVEL III: CPT | Performed by: INTERNAL MEDICINE

## 2022-04-22 PROCEDURE — 99285 EMERGENCY DEPT VISIT HI MDM: CPT | Mod: 25

## 2022-04-22 PROCEDURE — 250N000009 HC RX 250: Performed by: EMERGENCY MEDICINE

## 2022-04-22 PROCEDURE — 250N000011 HC RX IP 250 OP 636: Performed by: EMERGENCY MEDICINE

## 2022-04-22 PROCEDURE — U0005 INFEC AGEN DETEC AMPLI PROBE: HCPCS | Performed by: EMERGENCY MEDICINE

## 2022-04-22 PROCEDURE — 96372 THER/PROPH/DIAG INJ SC/IM: CPT | Performed by: EMERGENCY MEDICINE

## 2022-04-22 PROCEDURE — G0378 HOSPITAL OBSERVATION PER HR: HCPCS

## 2022-04-22 PROCEDURE — 84145 PROCALCITONIN (PCT): CPT | Performed by: EMERGENCY MEDICINE

## 2022-04-22 PROCEDURE — 93005 ELECTROCARDIOGRAM TRACING: CPT | Mod: 76

## 2022-04-22 PROCEDURE — 250N000013 HC RX MED GY IP 250 OP 250 PS 637: Performed by: EMERGENCY MEDICINE

## 2022-04-22 PROCEDURE — 80053 COMPREHEN METABOLIC PANEL: CPT | Performed by: EMERGENCY MEDICINE

## 2022-04-22 PROCEDURE — C9803 HOPD COVID-19 SPEC COLLECT: HCPCS

## 2022-04-22 PROCEDURE — 85025 COMPLETE CBC W/AUTO DIFF WBC: CPT | Performed by: EMERGENCY MEDICINE

## 2022-04-22 PROCEDURE — 84484 ASSAY OF TROPONIN QUANT: CPT | Performed by: EMERGENCY MEDICINE

## 2022-04-22 PROCEDURE — 83690 ASSAY OF LIPASE: CPT | Performed by: EMERGENCY MEDICINE

## 2022-04-22 PROCEDURE — 71275 CT ANGIOGRAPHY CHEST: CPT

## 2022-04-22 PROCEDURE — 36415 COLL VENOUS BLD VENIPUNCTURE: CPT | Performed by: EMERGENCY MEDICINE

## 2022-04-22 PROCEDURE — 250N000013 HC RX MED GY IP 250 OP 250 PS 637: Performed by: INTERNAL MEDICINE

## 2022-04-22 PROCEDURE — 93005 ELECTROCARDIOGRAM TRACING: CPT

## 2022-04-22 RX ORDER — NALOXONE HYDROCHLORIDE 0.4 MG/ML
0.4 INJECTION, SOLUTION INTRAMUSCULAR; INTRAVENOUS; SUBCUTANEOUS
Status: DISCONTINUED | OUTPATIENT
Start: 2022-04-22 | End: 2022-04-23 | Stop reason: HOSPADM

## 2022-04-22 RX ORDER — BISACODYL 10 MG
10 SUPPOSITORY, RECTAL RECTAL DAILY PRN
Status: DISCONTINUED | OUTPATIENT
Start: 2022-04-22 | End: 2022-04-23 | Stop reason: HOSPADM

## 2022-04-22 RX ORDER — BUSPIRONE HYDROCHLORIDE 5 MG/1
5 TABLET ORAL 2 TIMES DAILY
COMMUNITY

## 2022-04-22 RX ORDER — NALOXONE HYDROCHLORIDE 0.4 MG/ML
0.2 INJECTION, SOLUTION INTRAMUSCULAR; INTRAVENOUS; SUBCUTANEOUS
Status: DISCONTINUED | OUTPATIENT
Start: 2022-04-22 | End: 2022-04-23 | Stop reason: HOSPADM

## 2022-04-22 RX ORDER — ENOXAPARIN SODIUM 100 MG/ML
0.75 INJECTION SUBCUTANEOUS EVERY 12 HOURS
Status: DISCONTINUED | OUTPATIENT
Start: 2022-04-23 | End: 2022-04-23 | Stop reason: HOSPADM

## 2022-04-22 RX ORDER — ONDANSETRON 2 MG/ML
4 INJECTION INTRAMUSCULAR; INTRAVENOUS EVERY 6 HOURS PRN
Status: DISCONTINUED | OUTPATIENT
Start: 2022-04-22 | End: 2022-04-23 | Stop reason: HOSPADM

## 2022-04-22 RX ORDER — ONDANSETRON 4 MG/1
4 TABLET, ORALLY DISINTEGRATING ORAL EVERY 6 HOURS PRN
Status: DISCONTINUED | OUTPATIENT
Start: 2022-04-22 | End: 2022-04-23 | Stop reason: HOSPADM

## 2022-04-22 RX ORDER — TRIAMCINOLONE ACETONIDE 1 MG/G
OINTMENT TOPICAL 2 TIMES DAILY PRN
COMMUNITY

## 2022-04-22 RX ORDER — MULTIVITAMIN,THERAPEUTIC
1 TABLET ORAL DAILY
COMMUNITY

## 2022-04-22 RX ORDER — BUSPIRONE HYDROCHLORIDE 5 MG/1
5 TABLET ORAL 2 TIMES DAILY
Status: DISCONTINUED | OUTPATIENT
Start: 2022-04-22 | End: 2022-04-23 | Stop reason: HOSPADM

## 2022-04-22 RX ORDER — ENOXAPARIN SODIUM 100 MG/ML
30 INJECTION SUBCUTANEOUS ONCE
Status: COMPLETED | OUTPATIENT
Start: 2022-04-22 | End: 2022-04-22

## 2022-04-22 RX ORDER — MIRTAZAPINE 15 MG/1
15 TABLET, FILM COATED ORAL AT BEDTIME
Status: DISCONTINUED | OUTPATIENT
Start: 2022-04-22 | End: 2022-04-23 | Stop reason: HOSPADM

## 2022-04-22 RX ORDER — FENTANYL CITRATE 50 UG/ML
50 INJECTION, SOLUTION INTRAMUSCULAR; INTRAVENOUS ONCE
Status: COMPLETED | OUTPATIENT
Start: 2022-04-22 | End: 2022-04-22

## 2022-04-22 RX ORDER — CHLORAL HYDRATE 500 MG
2 CAPSULE ORAL DAILY
COMMUNITY

## 2022-04-22 RX ORDER — CALCIUM CARBONATE 500(1250)
1 TABLET ORAL DAILY
COMMUNITY

## 2022-04-22 RX ORDER — POLYETHYLENE GLYCOL 3350 17 G/17G
17 POWDER, FOR SOLUTION ORAL DAILY PRN
Status: DISCONTINUED | OUTPATIENT
Start: 2022-04-22 | End: 2022-04-23 | Stop reason: HOSPADM

## 2022-04-22 RX ORDER — IOPAMIDOL 755 MG/ML
45 INJECTION, SOLUTION INTRAVASCULAR ONCE
Status: COMPLETED | OUTPATIENT
Start: 2022-04-22 | End: 2022-04-22

## 2022-04-22 RX ORDER — ACETAMINOPHEN 500 MG
1000 TABLET ORAL ONCE
Status: COMPLETED | OUTPATIENT
Start: 2022-04-22 | End: 2022-04-22

## 2022-04-22 RX ORDER — ACETAMINOPHEN 325 MG/1
650 TABLET ORAL EVERY 6 HOURS PRN
Status: DISCONTINUED | OUTPATIENT
Start: 2022-04-22 | End: 2022-04-23 | Stop reason: HOSPADM

## 2022-04-22 RX ORDER — MIRTAZAPINE 15 MG/1
15 TABLET, FILM COATED ORAL AT BEDTIME
COMMUNITY

## 2022-04-22 RX ORDER — PROCHLORPERAZINE MALEATE 5 MG
5 TABLET ORAL EVERY 6 HOURS PRN
Status: DISCONTINUED | OUTPATIENT
Start: 2022-04-22 | End: 2022-04-23 | Stop reason: HOSPADM

## 2022-04-22 RX ORDER — ENOXAPARIN SODIUM 100 MG/ML
1 INJECTION SUBCUTANEOUS ONCE
Status: DISCONTINUED | OUTPATIENT
Start: 2022-04-22 | End: 2022-04-22

## 2022-04-22 RX ORDER — ACETAMINOPHEN 650 MG/1
650 SUPPOSITORY RECTAL EVERY 6 HOURS PRN
Status: DISCONTINUED | OUTPATIENT
Start: 2022-04-22 | End: 2022-04-23 | Stop reason: HOSPADM

## 2022-04-22 RX ORDER — PROCHLORPERAZINE 25 MG
12.5 SUPPOSITORY, RECTAL RECTAL EVERY 12 HOURS PRN
Status: DISCONTINUED | OUTPATIENT
Start: 2022-04-22 | End: 2022-04-23 | Stop reason: HOSPADM

## 2022-04-22 RX ORDER — HYDROXYZINE HYDROCHLORIDE 10 MG/1
10 TABLET, FILM COATED ORAL DAILY PRN
COMMUNITY

## 2022-04-22 RX ADMIN — ACETAMINOPHEN 650 MG: 325 TABLET ORAL at 17:06

## 2022-04-22 RX ADMIN — ENOXAPARIN SODIUM 30 MG: 30 INJECTION SUBCUTANEOUS at 13:57

## 2022-04-22 RX ADMIN — ACETAMINOPHEN 1000 MG: 500 TABLET, FILM COATED ORAL at 10:24

## 2022-04-22 RX ADMIN — OXYCODONE HYDROCHLORIDE 2.5 MG: 5 TABLET ORAL at 20:17

## 2022-04-22 RX ADMIN — FENTANYL CITRATE 50 MCG: 50 INJECTION, SOLUTION INTRAMUSCULAR; INTRAVENOUS at 13:57

## 2022-04-22 RX ADMIN — MIRTAZAPINE 15 MG: 15 TABLET, FILM COATED ORAL at 21:18

## 2022-04-22 RX ADMIN — IOPAMIDOL 45 ML: 755 INJECTION, SOLUTION INTRAVENOUS at 11:34

## 2022-04-22 RX ADMIN — BUSPIRONE HYDROCHLORIDE 5 MG: 5 TABLET ORAL at 20:17

## 2022-04-22 RX ADMIN — SODIUM CHLORIDE 75 ML: 9 INJECTION, SOLUTION INTRAVENOUS at 11:34

## 2022-04-22 NOTE — PROVIDER NOTIFICATION
MD Notification    Notified Person: MD    Notified Person Name: Dr. Ramirez    Notification Date/Time: 4-22 1836    Notification Interaction: web page    Purpose of Notification: Pt having chest pain rated 5 rt pulm emboli. Pt req additional pain meds. Tylenol given. Please order if approp.    Orders Received:    Comments:

## 2022-04-22 NOTE — PROGRESS NOTES
Observation goals  PRIOR TO DISCHARGE        Comments:   -diagnostic tests and consults completed and resulted not met  -vital signs normal or at patient baseline soft bp  -safe disposition plan has been identified not met  Nurse to notify provider when observation goals have been met and patient is ready for discharge.

## 2022-04-22 NOTE — ED TRIAGE NOTES
Right sided chest pain - sharp in nature - started yesterday afternoon  awoke her again at 0100 intermit with sharp pain got concern this morning at 0700 called daughter and came to the ER

## 2022-04-22 NOTE — H&P
[facility]    History and Physical - Hospitalist Service       Date of Admission:  4/22/2022    Assessment & Plan   Kavita Navarro is a 86 year-old female with history of tobacco use, Alzheimer's type dementia, anxiety and depression who presents with right sided chest pain. Admitted on 4/22/2022 after found to have PE.     Bilateral pulmonary emboli, subsegmental  *Presents with right sided pleuritic chest pain  *CT with small subsegmental pulmonary embolus in each upper lobe  *Current invasive squamous cell carcinoma of skin, in process of additional evaluation through dermatology; if more extensive involvement than currently known may be increasing  *Otherwise no clear provoking factor other than advanced age.   - Continue enoxaparin subcutaneous for now  - Pharmacy liaison consulted for DOAC coverage, can transition 4/23/22 once coverage is known  - Telemetry overnight  - Consider indefinite anticoagulation in absence of contraindication, ultimately will defer to PCP    Bronchiectasis with mucous plugging  Hx of smoking. No known lung disease. Associated peribronchial infiltrate on imaging, but no clinical evidence of pneumonia on labs or by symptoms and procalcitonin undetectable.   - No antibiotics indicated  - No intervention needed in absence of symptoms  - May consider outpatient PFTs once PE has resolved     Squamous cell carcinoma of the skin, invasive  Follows with dermatology. Planning for additional excision/biopsy.   - Continue outpatient follow-up     Anxiety and depression  - Continue prior to admission mirtazapine, buspirone    Alzheimer's type dementia  Mild per outside record.  - Re-orient as needed  - Maintain normal day/night, sleep wake cycles  - Minimize sedating/altering medications as able  - Treat separate conditions as detailed above/below        Diet:   Regular diet   DVT Prophylaxis: Enoxaparin   Richard Catheter: Not present  Code Status:    DNR/DNI    Disposition Plan   [place] to observation status. Anticipate discharge within 24 hours if clinically improved.     Entered: Michael Russ MD 04/22/2022, 2:16 PM     The patient's care was discussed with the patient and patient's daughter    Clinically Significant Risk Factors Present on Admission             # Hypoalbuminemia: Albumin = 3.3 g/dL (Ref range: 3.4 - 5.0 g/dL) on admission, will monitor as appropriate               Michael Russ MD  [facility]    ______________________________________________________________________    Chief Complaint   Right sided chest pain for 2 days    History of Present Illness   Kavita Navarro is a 86 year old female who presents with the above chief complaint.    History is obtained from the patient and patient's daughter. The patient reports the day prior to admission she felt acute onset of right sided chest pain, worse with deep breathing. She went about her day as usual, and when the pain persisted into the following morning, she called her daughter who brought her into the Emergency Department for further evaluation.     In the Emergency Department, the patient was seen by Dr. Hutchins, with whom I discussed the patient's presenting symptoms and emergency department course.  Initial vital signs were a temperature of 98.4F, HR 53, /85, RR 20, SpO2 95% on room air. Work-up included CT PE protocol demonstrating small subsegmental pulmonary embolus in each upper lobe, bronchiectasis with mucous plugging. Hospitalists were contacted for admission to the hospital.     She denies any prior history of blood clot.  Denies any family history of clotting disorder.  Denies any recent travel, surgery.  She is currently undergoing work-up and treatment for squamous cell skin carcinoma with plan for additional biopsy/excision due to positive margins.  She has a history of breast cancer which is considered cured after lumpectomy and  radiation.  She reports that she remained up to date on her colonoscopy and Pap smear until they were no longer recommended.  She denies any blood in her stools, blood in her urine. She reports her right leg has been slightly more swollen than usual. She denies any cough, fevers, chills.     Review of Systems    Complete 10 point review of systems assessed and negative except as noted in HPI.    Past Medical History    I have reviewed this patient's medical history and updated it with pertinent information if needed.   Past Medical History:   Diagnosis Date     Alzheimer's type dementia (H)      Breast cancer (H)      Depression with anxiety      Hepatitis A      Hyperlipidemia      Obstructive sleep apnea syndrome      Osteopenia      Squamous cell skin cancer      Urinary incontinence        Past Surgical History   I have reviewed this patient's surgical history and updated it with pertinent information if needed.  Past Surgical History:   Procedure Laterality Date     c sections       catartacts        LUMPECTOMY BREAST       Patellar bursectomy           Social History   I have reviewed this patient's social history and updated it with pertinent information if needed.  Social History     Tobacco Use     Smoking status: Former Smoker     Smokeless tobacco: Never Used     Tobacco comment: Smoked 1 pack per week, up until 10 years ago   Substance Use Topics     Alcohol use: Yes     Comment: 1 drink every other day     Drug use: No     Lives in independent senior living.     Family History   I have reviewed this patient's family history and updated it with pertinent information if needed.   Family History   Problem Relation Age of Onset     Cerebrovascular Disease Father      Heart Disease Father      Lung Cancer Sister      Heart Disease Sister      Alzheimer Disease Sister      Alcoholism Brother      Clotting Disorder No family hx of         Prior to Admission Medications   None     Allergies   No Known  Allergies    Physical Exam   Vital Signs: Temp: 98.4  F (36.9  C) Temp src: Oral BP: (!) 143/90 Pulse: 60   Resp: 24 SpO2: 97 % O2 Device: None (Room air)    Weight: 87 lbs 0 oz    Constitutional: Well-appearing, NAD  Eyes: PERRL, EOMI  HENT: Oropharynx clear, MMM  Respiratory: Clear to auscultation bilaterally, good air movement, normal effort   Cardiovascular: RRR, no m/r/g. Trace lower extremity edema on the right  GI: Soft, non-tender, non-distended. No rebound tenderness or guarding.    Skin: Warm, dry   Neurologic: Alert. Responding to questions appropriately. Following commands.   Psychiatric: Normal affect, appropriate      Data   Data reviewed today: I reviewed all medications, new labs and imaging results over the last 24 hours. I personally reviewed the EKG tracing showing sinus rhythm with PVCs.    Recent Labs   Lab 04/22/22  1005   WBC 7.8   HGB 12.2   MCV 99         POTASSIUM 3.9   CHLORIDE 108   CO2 28   BUN 17   CR 0.73   ANIONGAP 4   KEVIN 9.4   GLC 97   ALBUMIN 3.3*   PROTTOTAL 7.5   BILITOTAL 0.5   ALKPHOS 77   ALT 29   AST 26   LIPASE 73       Recent Results (from the past 24 hour(s))   CT Chest Pulmonary Embolism w Contrast   Result Value    Radiologist flags Pulmonary embolism (AA)    Narrative    CT CHEST PULMONARY EMBOLISM W CONTRAST 4/22/2022 11:50 AM    CLINICAL HISTORY: SOB, eval for PE, pleuritic R chest pain, high Dimer  TECHNIQUE: CT angiogram chest during arterial phase injection IV  contrast. 2D and 3D MIP reconstructions were performed by the CT  technologist. Dose reduction techniques were used.     CONTRAST: 45 mL Isovue-370    COMPARISON: None.    FINDINGS:  ANGIOGRAM CHEST: There are small subsegmental pulmonary emboli in both  upper lobes, for example in the left upper lobe on series 4 image 119  and in the right upper lobe on image 117. No additional emboli.  Thoracic aorta is negative for dissection. No CT evidence of right  heart strain.    LUNGS AND PLEURA: Patchy  peribronchial infiltrate in the inferior  right upper lobe. There is bronchiectasis with mucous plugging in the  right upper and middle lobes, as well as the inferior lingula. Mild  fibrosis in both lung bases.    MEDIASTINUM/AXILLAE: No lymphadenopathy. No visible coronary artery  calcification.    UPPER ABDOMEN: Normal.    MUSCULOSKELETAL: Normal.      Impression    IMPRESSION:  1.  Small subsegmental pulmonary embolus in each upper lobe. No larger  pulmonary emboli.  2.  Bronchiectasis with mucus plugging. Peribronchial  infiltrate/pneumonia in the right upper lobe.    [Critical Result: Pulmonary embolism]    Finding was identified on 4/22/2022 12:10 PM.     Dr. Hutchins was contacted by Dr. Morocho at 4/22/2022 12:18 PM and  verbalized understanding of the critical finding.     YULISA MOROCHO MD         SYSTEM ID:  [id number]

## 2022-04-22 NOTE — ED PROVIDER NOTES
History   Chief Complaint:  Chest Pain     HPI   History supplemented by electronic chart review  History limited as patient is a poor historian.    Kavita Navarro is a 86 year old female with history of hyperlipidemia, breast and skin cancer both in remission who presents with right-sided chest pain. The patient reports that yesterday she walked to [place], came back and ate a snack, and began having intermittent, pleuritic sharp right-sided chest pain. The pain woke her up this morning around 0100.  She still has some discomfort though it is not quite as intense.  No history of DVT or PE.  No fevers, cough, or trouble breathing.  No history of coronary artery disease.    Review of Systems   All other systems reviewed and are negative.    Allergies:  Niacin    Medications:  Atorvastatin  Mirtazapine  Buspirone  Atarax    Past Medical History:     Senile dementia of Alzheimer's type  Depression with anxiety  Skin cancer  Breast cancer  Mild malnutrition  Urinary incontinence  Colitis  Osteopenia  Hyperlipidemia   Obstructive sleep apnea    Past Surgical History:     section  Cataract removal  Patellar bursectomy  Dilation and curettage  Breast lumpectomy  Blepharoplasty      Family History:    Brother- alcoholism, diabetes mellitus   Father- stroke, heart disease  Mother- colon cancer  Sister- lung cancer, heart disease, Alzheimer's    Social History:  Presents with daughter   Lives in senior living facility    Physical Exam     Patient Vitals for the past 24 hrs:   BP Temp Temp src Pulse Resp SpO2 Height Weight   22 1415 -- -- -- -- 24 -- -- --   22 1400 (!) 154/82 -- -- -- 24 98 % -- --   22 1300 -- -- -- -- 24 97 % -- --   22 1130 -- -- -- 60 -- 97 % -- --   22 1100 (!) 143/90 -- -- 60 24 96 % -- --   22 1030 (!) 151/96 -- -- 64 18 96 % -- --   22 1000 (!) 157/99 -- -- -- (!) 43 97 % -- --   22 0945 -- -- -- 65 23 97 % -- --   22 0919 (!)  "161/85 98.4  F (36.9  C) Oral 53 20 95 % 1.6 m (5' 3\") 39.5 kg (87 lb)     Physical Exam  General: Nontoxic-appearing woman sitting upright in room 2, daughter at bedside  HENT: mucous membranes moist  CV: rate as above, regular rhythm, trace symmetric lower extremity edema, no JVD, palpable symmetric radial pulses  Resp: Appears somewhat uncomfortable with deep breaths though she is generally resting calmly in the gurney with normal respiratory effort, speaks in full phrases, no stridor, no cough observed  GI: abdomen soft and nontender, no guarding, negative Faustin's sign  MSK: no bony tenderness to chest  Skin: appropriately warm and dry, no erythema or vesicles to chest wall  Neuro: alert, clear speech, oriented though poor historian, no nuchal rigidity  Psych: cooperative, no evidence of hallucinations    Emergency Department Course   ECG  ECG obtained at 0933, ECG read at 0945  Chest pain  Sinus rhythm with occasional premature ventricular complexes; possible left atrial enlargement; septal infarct  as compared to prior, dated 11/23/2020  Rate 75 bpm. MS interval 146 ms. QRS duration 74 ms. QT/QTc 402/448 ms. P-R-T axes 86 46 68.     ECG  ECG taken at 1016, ECG read at 1021  Chest pain    No significant changes as compared to prior today.  Rate 66 bpm. MS interval * ms. QRS duration 86 ms. QT/QTc 412/431 ms. P-R-T axes 81 2 77.      Imaging:  CT Chest Pulmonary Embolism w Contrast   Final Result   Abnormal   IMPRESSION:   1.  Small subsegmental pulmonary embolus in each upper lobe. No larger   pulmonary emboli.   2.  Bronchiectasis with mucus plugging. Peribronchial   infiltrate/pneumonia in the right upper lobe.      [Critical Result: Pulmonary embolism]      Finding was identified on 4/22/2022 12:10 PM.       Dr. Hutchins was contacted by Dr. Acharya at 4/22/2022 12:18 PM and   verbalized understanding of the critical finding.       YULISA ACHARYA MD            SYSTEM ID:  [id number]      Report per " radiology    Laboratory:  Labs Ordered and Resulted from Time of ED Arrival to Time of ED Departure   COMPREHENSIVE METABOLIC PANEL - Abnormal       Result Value    Sodium 140      Potassium 3.9      Chloride 108      Carbon Dioxide (CO2) 28      Anion Gap 4      Urea Nitrogen 17      Creatinine 0.73      Calcium 9.4      Glucose 97      Alkaline Phosphatase 77      AST 26      ALT 29      Protein Total 7.5      Albumin 3.3 (*)     Bilirubin Total 0.5      GFR Estimate 80     D DIMER QUANTITATIVE - Abnormal    D-Dimer Quantitative 1.33 (*)    LIPASE - Normal    Lipase 73     TROPONIN I - Normal    Troponin I High Sensitivity 6     PROCALCITONIN - Normal    Procalcitonin <0.05     CBC WITH PLATELETS AND DIFFERENTIAL    WBC Count 7.8      RBC Count 3.92      Hemoglobin 12.2      Hematocrit 38.6      MCV 99      MCH 31.1      MCHC 31.6      RDW 14.0      Platelet Count 235      % Neutrophils 69      % Lymphocytes 18      % Monocytes 11      % Eosinophils 1      % Basophils 1      % Immature Granulocytes 0      NRBCs per 100 WBC 0      Absolute Neutrophils 5.3      Absolute Lymphocytes 1.4      Absolute Monocytes 0.9      Absolute Eosinophils 0.1      Absolute Basophils 0.1      Absolute Immature Granulocytes 0.0      Absolute NRBCs 0.0     COVID-19 VIRUS (CORONAVIRUS) BY PCR      Emergency Department Course:    Reviewed:  I reviewed nursing notes, vitals, past medical history and Care Everywhere    Assessments:  0949 I obtained history and examined the patient as noted above.   1243 I rechecked the patient and explained findings.     Consults:  1317 I consulted with Dr. Russ from hospitalist service.    Interventions:  Medications   acetaminophen (TYLENOL) tablet 1,000 mg (1,000 mg Oral Given 4/22/22 1024)   Saline flush (75 mLs Intravenous Given 4/22/22 1134)   iopamidol (ISOVUE-370) solution 45 mL (45 mLs Intravenous Given 4/22/22 1134)   fentaNYL (PF) (SUBLIMAZE) injection 50 mcg (50 mcg Intravenous Given  4/22/22 3427)   enoxaparin ANTICOAGULANT (LOVENOX) injection 30 mg (30 mg Subcutaneous Given 4/22/22 8187)     Disposition:  The patient was admitted to the hospital under the care of Dr. Russ.     Impression & Plan   Medical Decision Making:  Her acute pleuritic right-sided chest pain can be explained by pulmonary embolism that was seen on CT.  She additionally has radiographic abnormality in the right upper lobe, perhaps representing infection or other process contributing to her discomfort in this area, though given that she does not have fever, cough, dyspnea, leukocytosis, or hypoxia, and excepting hospitalist and I agreed to defer initiation of antibiotics at this time.  Patient and her daughter are concerned about the prospect of discharge and desire hospitalization for optimization of analgesia as well as initiation of anticoagulation, I do think is reasonable in light of her PESI score placing her in class IV, with increased risk of morbidity and mortality.  I have arranged for admission to the hospital service for further multidisciplinary care.  Lovenox order to initiate anticoagulation after specific discussion with accepting hospitalist.    Diagnosis:    ICD-10-CM    1. Other acute pulmonary embolism without acute cor pulmonale (H)  I26.99    2. Abnormal CT of the chest  R93.89      Scribe Disclosure:  I, Marietta Clifton, am serving as a scribe at 9:49 AM on 4/22/2022 to document services personally performed by Man Hutchins MD based on my observations and the provider's statements to me.     This note was completed in part using Dragon voice recognition software. Although reviewed after completion, some word and grammatical errors may occur.             Man Hutchins MD  04/22/22 0607

## 2022-04-22 NOTE — PROVIDER NOTIFICATION
MD Notification    Notified Person: MD    Notified Person Name: Dr. Russ    Notification Date/Time: 4-22 1845    Notification Interaction: web page    Purpose of Notification: Pt having chest pain rated 5 rt pulm emboli. Pt req additional pain meds. Tylenol given. Please order if approp.    Orders Received:    Comments:

## 2022-04-22 NOTE — ED NOTES
[facility]  ED Nurse Handoff Report    ED Chief complaint: Chest Pain      ED Diagnosis:   Final diagnoses:   Other acute pulmonary embolism without acute cor pulmonale (H)   Abnormal CT of the chest       Code Status: To be addressed with admitting    Allergies: No Known Allergies    Patient Story: Right sided chest pain - sharp in nature - started yesterday afternoon  awoke her again at 0100 intermit with sharp pain got concern this morning at 0700 called daughter and came to the ER     Focused Assessment:  C/o Sharp Rt sided chest pain that increases with deep breathing. Remains on RA. Also c/o RLE pain with slight swelling.     Abnormal Labs Resulted from Time of ED Arrival to Time of ED Departure   COMPREHENSIVE METABOLIC PANEL - Abnormal       Result Value    Sodium 140      Potassium 3.9      Chloride 108      Carbon Dioxide (CO2) 28      Anion Gap 4      Urea Nitrogen 17      Creatinine 0.73      Calcium 9.4      Glucose 97      Alkaline Phosphatase 77      AST 26      ALT 29      Protein Total 7.5      Albumin 3.3 (*)     Bilirubin Total 0.5      GFR Estimate 80     D DIMER QUANTITATIVE - Abnormal    D-Dimer Quantitative 1.33 (*)      CT Chest Pulmonary Embolism w Contrast   Final Result   Abnormal   IMPRESSION:   1.  Small subsegmental pulmonary embolus in each upper lobe. No larger   pulmonary emboli.   2.  Bronchiectasis with mucus plugging. Peribronchial   infiltrate/pneumonia in the right upper lobe.      [Critical Result: Pulmonary embolism]      Finding was identified on 4/22/2022 12:10 PM.       Dr. Hutchins was contacted by Dr. Morocho at 4/22/2022 12:18 PM and   verbalized understanding of the critical finding.       YULISA MOROCHO MD            SYSTEM ID:  [id number]          Treatments and/or interventions provided: Tylenol, Fentanyl, lovenox, CT, labs  Patient's response to treatments and/or interventions: Decreased pain.     To be done/followed up on inpatient unit:  to be  discussed with admitting    Does this patient have any cognitive concerns?: Forgetful; Dementia    Activity level - Baseline/Home:  Independent  Activity Level - Current:   Stand with Assist    Patient's Preferred language: English   Needed?: No    Isolation: None  Infection: Not Applicable  Patient tested for COVID 19 prior to admission: YES  Bariatric?: No    Vital Signs:   Vitals:    04/22/22 1100 04/22/22 1130 04/22/22 1300 04/22/22 1400   BP: (!) 143/90   (!) 154/82   Pulse: 60 60     Resp: 24  24 24   Temp:       TempSrc:       SpO2: 96% 97% 97% 98%   Weight:       Height:           Cardiac Rhythm:     Was the PSS-3 completed:   Yes  What interventions are required if any?               Family Comments: would like patient to stay overnight  OBS brochure/video discussed/provided to patient/family: Yes              Name of person given brochure if not patient: na              Relationship to patient: na    For the majority of the shift this patient's behavior was Green.   Behavioral interventions performed were na.    ED NURSE PHONE NUMBER: [phone]

## 2022-04-22 NOTE — PROGRESS NOTES
Orientation/Cognitive: Alert, Disoriented to place. Hx dementia, anxiety  Observation Goals (Met/ Not Met): Not met  Mobility Level/Assist Equipment: SBA  Fall Risk (Y/N): Y  Behavior Concerns: N  Pain Management: Right pleuritic chest pain rt bilateral PE in upper lobes. Tylenol given x1. Messaged hosp for additional pain med.   Tele/VS/O2: Tele NS, EKG aflutter AV block. Soft bp. RA. Shallow resp rt pain with deep breathing.   ABNL Lab/BG: CT shows PE and bronchiectasis w mucus plug. EKG abnormal. Ddimer 1.33  Diet: reg  Bowel/Bladder: cont  Skin Concerns: Hx invasive squamous cell carcinoma calves and back. Bruises inner thighs and calves. Slight edema LE. Cool feet, cap refill >3.   Drains/Devices: no  Tests/Procedures for next shift: TBD. Start anticoag.  Anticipated DC date & active delays: TBD  Patient Stated Goal for Today: pain relief

## 2022-04-22 NOTE — PHARMACY-ADMISSION MEDICATION HISTORY
Pharmacy Medication History  Admission medication history interview status for the 4/22/2022  admission is complete. See EPIC admission navigator for prior to admission medications     Location of Interview: Patient room  Medication history sources: Patient, Patient's family/friend (daughter) and Surescripts    Significant changes made to the medication list:  All added new    In the past week, patient estimated taking medication this percent of the time: greater than 90%    Medication reconciliation completed by provider prior to medication history? No    Time spent in this activity: 15 minutes    Prior to Admission medications    Medication Sig Last Dose Taking? Auth Provider   busPIRone (BUSPAR) 5 MG tablet Take 5 mg by mouth 2 times daily 4/21/2022 at Unknown time Yes Unknown, Entered By History   calcium carbonate (OS-KEVIN) 500 MG tablet Take 1 tablet by mouth daily 4/21/2022 at Unknown time Yes Unknown, Entered By History   fish oil-omega-3 fatty acids 1000 MG capsule Take 2 g by mouth daily 4/21/2022 at Unknown time Yes Unknown, Entered By History   hydrOXYzine (ATARAX) 10 MG tablet Take 10 mg by mouth daily as needed for itching prn Yes Unknown, Entered By History   hypromellose (ARTIFICIAL TEARS) 0.5 % SOLN ophthalmic solution Place 1 drop into both eyes 4 times daily as needed for dry eyes prn Yes Unknown, Entered By History   mirtazapine (REMERON) 15 MG tablet Take 15 mg by mouth At Bedtime 4/21/2022 at Unknown time Yes Unknown, Entered By History   multivitamin, therapeutic (THERA-VIT) TABS tablet Take 1 tablet by mouth daily 4/21/2022 at Unknown time Yes Unknown, Entered By History   triamcinolone (KENALOG) 0.1 % external ointment Apply topically 2 times daily as needed for irritation prn Yes Unknown, Entered By History       The information provided in this note is only as accurate as the sources available at the time of update(s)

## 2022-04-23 VITALS
TEMPERATURE: 98.6 F | SYSTOLIC BLOOD PRESSURE: 111 MMHG | DIASTOLIC BLOOD PRESSURE: 78 MMHG | WEIGHT: 84 LBS | HEIGHT: 62 IN | HEART RATE: 68 BPM | BODY MASS INDEX: 15.46 KG/M2 | RESPIRATION RATE: 20 BRPM | OXYGEN SATURATION: 97 %

## 2022-04-23 PROCEDURE — 250N000013 HC RX MED GY IP 250 OP 250 PS 637: Performed by: INTERNAL MEDICINE

## 2022-04-23 PROCEDURE — 96372 THER/PROPH/DIAG INJ SC/IM: CPT | Performed by: INTERNAL MEDICINE

## 2022-04-23 PROCEDURE — 250N000011 HC RX IP 250 OP 636: Performed by: INTERNAL MEDICINE

## 2022-04-23 PROCEDURE — 99217 PR OBSERVATION CARE DISCHARGE: CPT | Performed by: PHYSICIAN ASSISTANT

## 2022-04-23 PROCEDURE — G0378 HOSPITAL OBSERVATION PER HR: HCPCS

## 2022-04-23 RX ORDER — ACETAMINOPHEN 325 MG/1
975 TABLET ORAL 3 TIMES DAILY
COMMUNITY
Start: 2022-04-23

## 2022-04-23 RX ADMIN — ACETAMINOPHEN 650 MG: 325 TABLET ORAL at 09:32

## 2022-04-23 RX ADMIN — ENOXAPARIN SODIUM 30 MG: 30 INJECTION SUBCUTANEOUS at 15:02

## 2022-04-23 RX ADMIN — BUSPIRONE HYDROCHLORIDE 5 MG: 5 TABLET ORAL at 09:31

## 2022-04-23 RX ADMIN — OXYCODONE HYDROCHLORIDE 2.5 MG: 5 TABLET ORAL at 06:37

## 2022-04-23 RX ADMIN — ENOXAPARIN SODIUM 30 MG: 30 INJECTION SUBCUTANEOUS at 02:08

## 2022-04-23 NOTE — PROGRESS NOTES
Observation goals  PRIOR TO DISCHARGE       Comments:   -diagnostic tests and consults completed and resulted Not met  -vital signs normal or at patient baseline Bp slightly elevated Partially Met  -safe disposition plan has been identified Not Met  Nurse to notify provider when observation goals have been met and patient is ready for discharge.

## 2022-04-23 NOTE — PLAN OF CARE
Orientation/Cognitive: A&O. Hx dementia  Observation Goals (Met/ Not Met): Met, cleared for disch  Mobility Level/Assist Equipment: SBA w  Fall Risk (Y/N): Y  Behavior Concerns: N  Pain Management: tylenol, oxycodone for pleuritic pain.   Tele/VS/O2: Tele NS VSS on RA. Shallow resp rt pain with deep breathing  ABNL Lab/BG: Ddimer  Diet: reg  Bowel/Bladder: cont  Skin Concerns: Hx skin cancer, scattered bruising thighs and calves. Scabs calves and back rt skin cancer  Drains/Devices: no  Tests/Procedures for next shift: no  Anticipated DC date & active delays:4-23   Patient Stated Goal for Today: go home    AVS reviewed. Pt in agreement. Pt and son to  meds at [facility]. Education given on anticoag. Disch home with son.

## 2022-04-23 NOTE — PLAN OF CARE
Observation goals  PRIOR TO DISCHARGE       Comments:   -diagnostic tests and consults completed and resulted Not met  -vital signs normal or at patient baseline soft bp Partially Met  -safe disposition plan has been identified Not Met  Nurse to notify provider when observation goals have been met and patient is ready for discharge.

## 2022-04-23 NOTE — DISCHARGE SUMMARY
[facility]    Discharge Summary  Hospitalist    Date of Admission:  4/22/2022  Date of Discharge:  4/23/2022  Discharging Provider: Randall Mortensen PA-C    Discharge Diagnoses      Other acute pulmonary embolism without acute cor pulmonale (H)  Abnormal CT of the chest    History of Present Illness   Kavita Navarro is an 86 year old female who presented with right sided chest pain, identified to have a PE.    HPI from admission H&P:  Kavita Navarro is a 86 year old female who presents with the above chief complaint.     History is obtained from the patient and patient's daughter. The patient reports the day prior to admission she felt acute onset of right sided chest pain, worse with deep breathing. She went about her day as usual, and when the pain persisted into the following morning, she called her daughter who brought her into the Emergency Department for further evaluation.      In the Emergency Department, the patient was seen by Dr. Hutchins, with whom I discussed the patient's presenting symptoms and emergency department course.  Initial vital signs were a temperature of 98.4F, HR 53, /85, RR 20, SpO2 95% on room air. Work-up included CT PE protocol demonstrating small subsegmental pulmonary embolus in each upper lobe, bronchiectasis with mucous plugging. Hospitalists were contacted for admission to the hospital.      She denies any prior history of blood clot.  Denies any family history of clotting disorder.  Denies any recent travel, surgery.  She is currently undergoing work-up and treatment for squamous cell skin carcinoma with plan for additional biopsy/excision due to positive margins.  She has a history of breast cancer which is considered cured after lumpectomy and radiation.  She reports that she remained up to date on her colonoscopy and Pap smear until they were no longer recommended.  She denies any blood in her stools, blood in her urine. She reports her  Speech Therapy      Visit Type: Treatment  -  Swallow      Current Diet: easy chew/dysphagia 3 and thin liquids      - Feeding: feeds self      - Tray set-up  SUBJECTIVE  SLP saw patient bedside during breakfast meal.  Patient initially requested \"leave me alone. Can't I just eat?,\" but responded to therapist and MD questions upon him entering the room as well.   She ate 25% of meal and refused further trials of oatmeal. Patient agreed to participate in therapy this date.   Patient/family goals: maximize function     OBJECTIVE       Swallowing   Patient positioning: upright in bed    Consistencies:  Thin Liquid (cup):    - Amount given:5 sips   - Oral: impaired  , anterior loss, oral residue, suspect premature spillage and reduced labial closure   - Pharyngeal: impaired, suspect decreased hyolaryngeal elevation, reduced swallow/respiration coordination and suspect delayed swallow response  Pudding Thick Liquid:    - Amount given:5 bites   - Oral: impaired, anterior loss, oral residue, reduced propulsion and reduced labial closure   - Pharyngeal: impaired, suspect delayed swallow response and suspect decreased hyolaryngeal elevation  Pudding thick comments: Patient had difficulty feeding herself with dexterity of maneuvering spoon with oatmeal.  She switched to the larger soup spoon for ease of feeding and was able to go slow with small bites as she had difficulty manipulating bolus with decreased labial seal and lingual ROM/strength    Larger Leone mug diameter straw present bedside with pink lemonade.  SLP asked if it was hers and she said \"yes'  SLP switched the liquid into a cup with a coffee lid for ease of drinking and took straw away due to rec.'d results from [facility] for NO straws.  Patient did not object and had no s/s of aspiration with thin liquid using the lid.             ASSESSMENT                                                                        • Impairments: swallowing and insight/awareness  •  Functional Limitations: communication/cognition and eating/drinking  • Skilled therapy is required to establish safe means of nutrition, hydration and medication administration  • Patient continues to present with mild-moderate oropharyngeal dysphagia characterized by decreased labial and lingual control/ROM/strength and coordination and suspected premature spillage of thin liquids and risk for aspiration with vallecular residue and decreased pharyngeal constriction.   • Rehab Potential: good    Education Provided:   Education provided during session:  - Receiving education: patient  - dysphagia and aspiration precautions  - Results of above outlined education: Needs reinforcement  Patient at end of session:    - location: in bed    PLAN    Interventions:  Dysphagia therapy and patient/family education    Plan/Goal Agreement:  Patient agrees with goals and treatment plan    RECOMMENDATIONS     -Diet:          *easy chew/dysphagia 3 and thin liquids    -Feeding Guidelines:          *feeds self, no straws, sit fully upright for all po intake, distraction free environment, eat slowly only when alert, small bites/sips and set up tray    -Additional Swallow Recommendations:         *frequent oral care    GOALS    Short Term Goals:   Pt will demo use of aspiration precautions with min verbal cues across 10+ PO trials - NOT MET. Continue    Pt will demo safe intake on upgraded diet consistency with no clinical s/s of aspiration across 10+ trials -Continue          Therapy procedure time and total treatment time can be found documented on the Time Entry flowsheet   right leg has been slightly more swollen than usual. She denies any cough, fevers, chills.     Hospital Course   Kavita Navarro was admitted on 4/22/2022.  The following problems were addressed during her hospitalization:    Bilateral pulmonary emboli, subsegmental  *Presents with right sided pleuritic chest pain  *CT with small subsegmental pulmonary embolus in each upper lobe  *Current invasive squamous cell carcinoma of skin, in process of additional evaluation through dermatology; if more extensive involvement than currently known may be increasing  *Otherwise no clear provoking factor other than advanced age.   - Pharmacy advised against Eliquis given age, CrCl, weight. Recommended low-dose Xarelto versus warfarin with lovenox bridge.  - Pharmacy liaison consulted for DOAC coverage, full services unavailable over weekend but outpatient pharmacy checked with pt's insurance and quoted Xarelto at ~$500/mo. Discussed with patient and son, who would still prefer DOAC over coumadin/warfarin  - Pt discharged with Xarelto 15mg daily (dose appropriate given age, weight per pharmacy)  - Consider indefinite anticoagulation in absence of contraindication, ultimately will defer to PCP     Bronchiectasis with mucous plugging  Hx of smoking. No known lung disease. Associated peribronchial infiltrate on imaging, but no clinical evidence of pneumonia on labs or by symptoms and procalcitonin undetectable.   - No antibiotics indicated  - No intervention needed in absence of symptoms  - May consider outpatient PFTs once PE has resolved      Squamous cell carcinoma of the skin, invasive  Follows with dermatology. Planning for additional excision/biopsy.   - Continue outpatient follow-up      Anxiety and depression  - Continue prior to admission mirtazapine, buspirone     Alzheimer's type dementia  Mild per outside record, SLUMS 18/30. Cannot follow conversation, asks repeated questions.    Randall Mortensen PA-C,  JE    Significant Results and Procedures   As noted    Pending Results   These results will be followed up by n/a  Unresulted Labs Ordered in the Past 30 Days of this Admission     No orders found for last 31 day(s).          Code Status   DNR / DNI       Primary Care Physician   Radha Gonzales V    Physical Exam   Temp: 98.6  F (37  C) Temp src: Axillary BP: 111/78 Pulse: 68   Resp: 20 SpO2: 97 % O2 Device: None (Room air)    Vitals:    04/22/22 0919 04/22/22 1640   Weight: 39.5 kg (87 lb) 38.1 kg (84 lb)     Vital Signs with Ranges  Temp:  [97.5  F (36.4  C)-98.6  F (37  C)] 98.6  F (37  C)  Pulse:  [51-75] 68  Resp:  [20-21] 20  BP: ()/(58-88) 111/78  SpO2:  [95 %-98 %] 97 %  I/O last 3 completed shifts:  In: 100 [P.O.:100]  Out: -     GEN: well-developed, thin elderly female, appears comfortable  PULM: lungs CTA bilaterally, no increased work of breathing, no wheeze, rales, rhonchi  CV: RRR, S1 & S2, no murmur  GI: soft, nontender, nondistended, no guarding or rigidity, +BS in all 4 quadrants  SKIN: warm & dry without rash, wound, or pedal edema    Discharge Disposition   Discharged to home  Condition at discharge: Stable    Consultations This Hospital Stay   PHARMACY LIAISON FOR MEDICATION COVERAGE CONSULT  PHARMACY LIAISON FOR MEDICATION COVERAGE CONSULT  PHARMACY TO DOSE WARFARIN    Time Spent on this Encounter   IRandall PA-C, personally saw the patient today and spent greater than 30 minutes discharging this patient.    Discharge Orders      Reason for your hospital stay    Chest discomfort and pulmonary emboli. You have been started on a blood thinner called Xarelto for treatment, will need to follow up with your primary care provider (Dr. Thompson) for ongoing evaluation, management.     Follow-up and recommended labs and tests     Follow up with primary care provider, within 7 days to evaluate medication change and for hospital follow- up.  No follow up labs or test are needed.     Activity     Your activity upon discharge: activity as tolerated     Diet    Follow this diet upon discharge: Orders Placed This Encounter      Regular Diet Adult     Discharge Medications   Current Discharge Medication List      START taking these medications    Details   acetaminophen (TYLENOL) 325 MG tablet Take 3 tablets (975 mg) by mouth 3 times daily for 3 days, then as needed for ongoing pain.    Associated Diagnoses: Other acute pulmonary embolism without acute cor pulmonale (H)      rivaroxaban ANTICOAGULANT (XARELTO) 15 MG TABS tablet Take 1 tablet (15 mg) by mouth daily (with dinner)  Qty: 30 tablet, Refills: 1    Comments: Future refills by primary care physician or cardiologist  Associated Diagnoses: Other acute pulmonary embolism without acute cor pulmonale (H)         CONTINUE these medications which have NOT CHANGED    Details   busPIRone (BUSPAR) 5 MG tablet Take 5 mg by mouth 2 times daily      calcium carbonate (OS-KEVIN) 500 MG tablet Take 1 tablet by mouth daily      fish oil-omega-3 fatty acids 1000 MG capsule Take 2 g by mouth daily      hydrOXYzine (ATARAX) 10 MG tablet Take 10 mg by mouth daily as needed for itching      hypromellose (ARTIFICIAL TEARS) 0.5 % SOLN ophthalmic solution Place 1 drop into both eyes 4 times daily as needed for dry eyes      mirtazapine (REMERON) 15 MG tablet Take 15 mg by mouth At Bedtime      multivitamin, therapeutic (THERA-VIT) TABS tablet Take 1 tablet by mouth daily      triamcinolone (KENALOG) 0.1 % external ointment Apply topically 2 times daily as needed for irritation           Allergies   No Known Allergies  Data   Most Recent 3 CBC's:  Recent Labs   Lab Test 04/22/22  1005 11/23/20  1114 12/03/18  1101   WBC 7.8 6.0 4.4   HGB 12.2 13.0 12.8   MCV 99 95 93    249 212      Most Recent 3 BMP's:  Recent Labs   Lab Test 04/22/22  1005 11/23/20  1114 12/03/18  1101    137 140   POTASSIUM 3.9 3.9 3.8   CHLORIDE 108 106 107   CO2 28 26 28   BUN 17 17 15   CR 0.73  0.67 0.67   ANIONGAP 4 5 5   KEVIN 9.4 9.0 8.9   GLC 97 169* 102*     Most Recent 2 LFT's:  Recent Labs   Lab Test 04/22/22  1005 11/23/20  1114   AST 26 24   ALT 29 23   ALKPHOS 77 62   BILITOTAL 0.5 0.2     Most Recent INR's and Anticoagulation Dosing History:  Anticoagulation Dose History    There is no flowsheet data to display.       Most Recent 3 Troponin's:  Recent Labs   Lab Test 11/23/20  1114   TROPI <0.015     Most Recent Cholesterol Panel:No lab results found.  Most Recent 6 Bacteria Isolates From Any Culture (See EPIC Reports for Culture Details):No lab results found.  Most Recent TSH, T4 and A1c Labs:No lab results found.  Results for orders placed or performed during the hospital encounter of 04/22/22   CT Chest Pulmonary Embolism w Contrast     Value    Radiologist flags Pulmonary embolism (AA)    Narrative    CT CHEST PULMONARY EMBOLISM W CONTRAST 4/22/2022 11:50 AM    CLINICAL HISTORY: SOB, eval for PE, pleuritic R chest pain, high Dimer  TECHNIQUE: CT angiogram chest during arterial phase injection IV  contrast. 2D and 3D MIP reconstructions were performed by the CT  technologist. Dose reduction techniques were used.     CONTRAST: 45 mL [id number]    COMPARISON: None.    FINDINGS:  ANGIOGRAM CHEST: There are small subsegmental pulmonary emboli in both  upper lobes, for example in the left upper lobe on series 4 image 119  and in the right upper lobe on image 117. No additional emboli.  Thoracic aorta is negative for dissection. No CT evidence of right  heart strain.    LUNGS AND PLEURA: Patchy peribronchial infiltrate in the inferior  right upper lobe. There is bronchiectasis with mucous plugging in the  right upper and middle lobes, as well as the inferior lingula. Mild  fibrosis in both lung bases.    MEDIASTINUM/AXILLAE: No lymphadenopathy. No visible coronary artery  calcification.    UPPER ABDOMEN: Normal.    MUSCULOSKELETAL: Normal.      Impression    IMPRESSION:  1.  Small subsegmental  pulmonary embolus in each upper lobe. No larger  pulmonary emboli.  2.  Bronchiectasis with mucus plugging. Peribronchial  infiltrate/pneumonia in the right upper lobe.    [Critical Result: Pulmonary embolism]    Finding was identified on 4/22/2022 12:10 PM.     Dr. Hutchins was contacted by Dr. Morocho at 4/22/2022 12:18 PM and  verbalized understanding of the critical finding.     YULISA MOROCHO MD         SYSTEM ID:  [id number]

## 2022-04-23 NOTE — PLAN OF CARE
Orientation/Cognitive: AO X4, Hx dementia  Observation Goals (Met/ Not Met): Not Met  Mobility Level/Assist Equipment: SBA with gb and walker  Fall Risk (Y/N): Yes  Behavior Concerns: None  Pain Management: Right pleuritic chest pain Oxy given X1  Tele/VS/O2: Tele SR, VSS on RA ex Htn  ABNL Lab/BG: CT shows Small subsegmental pulmonary embolus in each upper lobe and pneumonia, D-dimer 1.33 and albumin 3.3  Diet: Regular   Bowel/Bladder: Continent  Skin Concerns: invasive squamous cell carcinoma, scattered bruises  Drains/Devices: PIV SL  Tests/Procedures for next shift: Continue Anticoagulant (LOVENOX) sub Q  Anticipated DC date & active delays: possible today  Patient Stated Goal for Today: Pain free    Observation goals  PRIOR TO DISCHARGE       Comments:   -diagnostic tests and consults completed and resulted Not met  -vital signs normal or at patient baseline soft bp Partially Met  -safe disposition plan has been identified Not Met  Nurse to notify provider when observation goals have been met and patient is ready for discharge.

## 2022-12-19 ENCOUNTER — NURSE TRIAGE (OUTPATIENT)
Dept: INTERNAL MEDICINE | Facility: CLINIC | Age: 87
End: 2022-12-19

## 2022-12-19 NOTE — TELEPHONE ENCOUNTER
Pt walked into clinic with daughter in law. Pt reports that she was not able to sleep last night. Bothered most by her legs being cold. Pt states that she was worried something was wrong and was not sure if her heart was still beating. Reports she normally takes two pills to help her sleep, ran out and was only able to take one last night. Not able to say what medication she took but reports getting it OTC. Reports frequent medium soft bowel movements and urination, but reports that it is no change to her baseline. Reports drinking plenty of water. Upon chart review, mild Alzheimer's.    Assessment:  Denies HA, SOB, change in vision, no chest pain. No vomiting, nausea or diarrhea. Reports overnight she felt weaker, but reports normal gait this AM. Pt reports she is very thin because she does not each much. Pt  Reports feeling anxious and nervous that something was going on. Pt alert and oriented to person, place. Occassionally confused about time, repeating same questions.    Pt stating feeling relieved when heart rate relayed to her.     BP: 178/75  Pulse: 64  Temporal Temp: 96.7  Pulse ox: 98    Per protocol pt to be seen within 3 days (d/t pt wanting to be examined). No appointments available today. Advised pt and DOL to have pt follow-up with primary or to seek care at  or ED if develops any red flag sx such as SOB, chest pain.     Informed pt that BP can be rechecked at most pharmacy's (including [facility]).     Pt and DOL had no additional concerns at this time.     Additional Information    Negative: SEVERE difficulty breathing (e.g., struggling for each breath, speaks in single words)    Negative: Bluish (or gray) lips or face    Negative: Difficult to awaken or acting confused (e.g., disoriented, slurred speech)    Negative: Hysterical or combative behavior    Negative: Sounds like a life-threatening emergency to the triager    Negative: Chest pain    Negative: Cough is main symptom    Negative: Suicide  thoughts, threats, attempts, or questions    Negative: Depression is main problem or symptom (e.g., feelings of sadness or hopelessness)    Negative: Palpitations, skipped heart beat, or rapid heart beat    Negative: Difficulty breathing and persists > 10 minutes and not relieved by reassurance provided by triager    Negative: Lightheadedness or dizziness and persists > 10 minutes and not relieved by reassurance provided by triager    Negative: Substance use (drug use) or unhealthy alcohol use, known or suspected, and feeling very shaky (i.e., visible tremors of hands)    Negative: Patient sounds very sick or weak to the triager    Negative: Patient sounds very upset or troubled to the triager    Negative: Symptoms interfere with work or school    Negative: Symptoms of anxiety or panic and has not been evaluated for this by physician    Negative: Started on anti-anxiety medication and no relief    Negative: Significant weight loss (or gain) and not dieting    Negative: Taking thyroid medications    Negative: Substance use (drug use) or unhealthy alcohol use, known or suspected    Negative: Unhealthy alcohol use, known or suspected    Negative: Unhealthy caffeine use, known or suspected (e.g., > 2 cups of coffee/tea or > 4 cans of soda / day)    Negative: Taking herbal remedies    Negative: Recent traumatic event (e.g., death of a loved one, job loss, victim/witness of crime)    Negative: Requesting to talk to a counselor (e.g., mental health worker, psychiatrist)    Negative: Patient wants to be seen    Protocols used: ANXIETY AND PANIC ATTACK-A-OH    Aurea Foster RN

## 2023-10-12 ENCOUNTER — HOSPITAL ENCOUNTER (EMERGENCY)
Facility: CLINIC | Age: 88
Discharge: HOME | End: 2023-10-12
Attending: EMERGENCY MEDICINE | Admitting: EMERGENCY MEDICINE
Payer: MEDICARE

## 2023-10-12 ENCOUNTER — APPOINTMENT (OUTPATIENT)
Dept: CT IMAGING | Facility: CLINIC | Age: 88
End: 2023-10-12
Attending: EMERGENCY MEDICINE
Payer: MEDICARE

## 2023-10-12 VITALS
WEIGHT: 85 LBS | TEMPERATURE: 96.9 F | HEART RATE: 62 BPM | OXYGEN SATURATION: 100 % | SYSTOLIC BLOOD PRESSURE: 153 MMHG | BODY MASS INDEX: 15.55 KG/M2 | DIASTOLIC BLOOD PRESSURE: 71 MMHG | RESPIRATION RATE: 20 BRPM

## 2023-10-12 DIAGNOSIS — D64.9 ANEMIA, UNSPECIFIED TYPE: ICD-10-CM

## 2023-10-12 DIAGNOSIS — R53.1 GENERALIZED WEAKNESS: ICD-10-CM

## 2023-10-12 LAB
ALBUMIN UR-MCNC: NEGATIVE MG/DL
AMORPH CRY #/AREA URNS HPF: ABNORMAL /HPF
ANION GAP SERPL CALCULATED.3IONS-SCNC: 12 MMOL/L (ref 7–15)
APPEARANCE UR: ABNORMAL
BASO+EOS+MONOS # BLD AUTO: ABNORMAL 10*3/UL
BASO+EOS+MONOS NFR BLD AUTO: ABNORMAL %
BASOPHILS # BLD AUTO: 0.1 10E3/UL (ref 0–0.2)
BASOPHILS NFR BLD AUTO: 1 %
BILIRUB UR QL STRIP: NEGATIVE
BUN SERPL-MCNC: 23.4 MG/DL (ref 8–23)
CALCIUM SERPL-MCNC: 9.5 MG/DL (ref 8.8–10.2)
CHLORIDE SERPL-SCNC: 102 MMOL/L (ref 98–107)
COLOR UR AUTO: ABNORMAL
CREAT SERPL-MCNC: 0.71 MG/DL (ref 0.51–0.95)
DEPRECATED HCO3 PLAS-SCNC: 25 MMOL/L (ref 22–29)
EGFRCR SERPLBLD CKD-EPI 2021: 82 ML/MIN/1.73M2
EOSINOPHIL # BLD AUTO: 0.1 10E3/UL (ref 0–0.7)
EOSINOPHIL NFR BLD AUTO: 2 %
ERYTHROCYTE [DISTWIDTH] IN BLOOD BY AUTOMATED COUNT: 13.3 % (ref 10–15)
GLUCOSE SERPL-MCNC: 88 MG/DL (ref 70–99)
GLUCOSE UR STRIP-MCNC: NEGATIVE MG/DL
HCT VFR BLD AUTO: 32.8 % (ref 35–47)
HGB BLD-MCNC: 10.5 G/DL (ref 11.7–15.7)
HGB UR QL STRIP: NEGATIVE
IMM GRANULOCYTES # BLD: 0 10E3/UL
IMM GRANULOCYTES NFR BLD: 0 %
KETONES UR STRIP-MCNC: ABNORMAL MG/DL
LEUKOCYTE ESTERASE UR QL STRIP: ABNORMAL
LYMPHOCYTES # BLD AUTO: 1.6 10E3/UL (ref 0.8–5.3)
LYMPHOCYTES NFR BLD AUTO: 33 %
MCH RBC QN AUTO: 31.1 PG (ref 26.5–33)
MCHC RBC AUTO-ENTMCNC: 32 G/DL (ref 31.5–36.5)
MCV RBC AUTO: 97 FL (ref 78–100)
MONOCYTES # BLD AUTO: 0.6 10E3/UL (ref 0–1.3)
MONOCYTES NFR BLD AUTO: 12 %
MUCOUS THREADS #/AREA URNS LPF: PRESENT /LPF
NEUTROPHILS # BLD AUTO: 2.5 10E3/UL (ref 1.6–8.3)
NEUTROPHILS NFR BLD AUTO: 52 %
NITRATE UR QL: NEGATIVE
NRBC # BLD AUTO: 0 10E3/UL
NRBC BLD AUTO-RTO: 0 /100
PH UR STRIP: 7.5 [PH] (ref 5–7)
PLATELET # BLD AUTO: 271 10E3/UL (ref 150–450)
POTASSIUM SERPL-SCNC: 4.3 MMOL/L (ref 3.4–5.3)
RBC # BLD AUTO: 3.38 10E6/UL (ref 3.8–5.2)
RBC URINE: 0 /HPF
SODIUM SERPL-SCNC: 139 MMOL/L (ref 135–145)
SP GR UR STRIP: 1.02 (ref 1–1.03)
SQUAMOUS EPITHELIAL: <1 /HPF
UROBILINOGEN UR STRIP-MCNC: NORMAL MG/DL
WBC # BLD AUTO: 4.8 10E3/UL (ref 4–11)
WBC URINE: 7 /HPF

## 2023-10-12 PROCEDURE — 36415 COLL VENOUS BLD VENIPUNCTURE: CPT | Performed by: EMERGENCY MEDICINE

## 2023-10-12 PROCEDURE — 85025 COMPLETE CBC W/AUTO DIFF WBC: CPT | Performed by: EMERGENCY MEDICINE

## 2023-10-12 PROCEDURE — 81001 URINALYSIS AUTO W/SCOPE: CPT | Performed by: EMERGENCY MEDICINE

## 2023-10-12 PROCEDURE — 99284 EMERGENCY DEPT VISIT MOD MDM: CPT | Mod: 25

## 2023-10-12 PROCEDURE — 80048 BASIC METABOLIC PNL TOTAL CA: CPT | Performed by: EMERGENCY MEDICINE

## 2023-10-12 PROCEDURE — 70450 CT HEAD/BRAIN W/O DYE: CPT | Mod: MA

## 2023-10-12 ASSESSMENT — ACTIVITIES OF DAILY LIVING (ADL): ADLS_ACUITY_SCORE: 33

## 2023-10-12 NOTE — ED NOTES
PIT/Triage Evaluation    Patient presented with concerns about legs buckling over the past 3 weeks.  She will be walking and her legs will suddenly buckle at her knees.  She is unsure of the cause of this.  She has memory difficulties has limited history.  Son is here with her and reports that it happens occasionally.  She has not had any falls related to the right knee buckling.  She does not typically use a walker.  She not had fevers.  She denies any back pain or pain elsewhere.    Exam is notable for:  Neuro: Extends legs normally, sitting upright in wheelchair. SILT In bilateral lower extremities    Appropriate interventions for symptom management were initiated if applicable.  Appropriate diagnostic tests were initiated if indicated.    Important information for subsequent clinician:  Legs intermittently buckling.  Not using walker at home.  Family working on getting alternate living situation.  Patient with poor short-term memory.    I briefly evaluated the patient and developed an initial plan of care. I discussed this plan and explained that this brief interaction does not constitute a full evaluation. Patient/family understands that they should wait to be fully evaluated and discuss any test results with another clinician prior to leaving the hospital.       Andrzej Santillan MD  10/12/23 1464

## 2023-10-12 NOTE — ED TRIAGE NOTES
Legs have been buckling recently - last three weeks, lives in independent living apartment. Unsteady on her feet. Son brought patient in with concern of falls. Does not use walker or cane.

## 2023-10-13 NOTE — ED PROVIDER NOTES
History     Chief Complaint:  Extremity Weakness       HPI   Kavita Navarro is a 88 year old female anticoagulated on xarelto with a history of PE, breast cancer, hepatitis A, hyperlipidemia and Obstructive Sleep Apnea presents to the ED with concerns about legs buckling over the past 3 weeks.  She will be walking and her legs will suddenly buckle at her knees.  She is unsure of the cause of this.  She has memory difficulties has limited history.  Son is here with her and reports that it happens occasionally.  She has not had any falls related to the right knee buckling.  She does not typically use a walker.  She not had fevers.  She denies any back pain or pain elsewhere. Lives independently.    Independent Historian:   None - Patient Only      Medications:    Buspar   Atarax   Remeron   Xarelto   Kenalog   Vanicream   Triamcinolone   Ciclopirox     Past Medical History:    PE  Alzheimer's type dementia   Breast cancer   Depression with anxiety  Hepatitis A  Hyperlipidemia  Obstructive sleep apnea syndrome  Osteopenia  Squamous cell skin cancer  Urinary incontinence  Microscopic colitis   Dermatitis    Past Surgical History:     x2  Cataracts  Lumpectomy   Patellar bursectomy   Colonoscopy x3  Esophagogastroduodenoscopy   Blepharoplasty   Dilation and curettage     Physical Exam   Patient Vitals for the past 24 hrs:   BP Temp Temp src Pulse Resp SpO2 Weight   10/12/23 1752 (!) 153/71 96.9  F (36.1  C) Temporal 62 20 100 % 38.6 kg (85 lb)      Physical Exam  Eyes:  The pupils are equal and round    Conjunctivae and sclerae are normal  ENT:    The nose is normal    Pinnae are normal  CV:  Regular rate and rhythm     No edema  Resp:  Lungs are clear    Non-labored    No rales    No wheezing   MS:  Normal muscular tone    No asymmetric leg swelling  Skin:  No rash or acute skin lesions noted  Neuro:   Awake, alert.      Poor short-term memory    Speech is normal and fluent.    Face is symmetric.     Moves  all extremities    Normal strength with dorsiflexion and plantarflexion in the ankles    Normal straight leg raise bilaterally      Emergency Department Course     Imaging:  CT Head w/o Contrast   Final Result   IMPRESSION:   1.  No CT evidence for acute intracranial process.   2.  Brain atrophy and presumed chronic microvascular ischemic changes as above.         Laboratory:  Labs Ordered and Resulted from Time of ED Arrival to Time of ED Departure   BASIC METABOLIC PANEL - Abnormal       Result Value    Sodium 139      Potassium 4.3      Chloride 102      Carbon Dioxide (CO2) 25      Anion Gap 12      Urea Nitrogen 23.4 (*)     Creatinine 0.71      GFR Estimate 82      Calcium 9.5      Glucose 88     ROUTINE UA WITH MICROSCOPIC REFLEX TO CULTURE - Abnormal    Color Urine Straw      Appearance Urine Cloudy (*)     Glucose Urine Negative      Bilirubin Urine Negative      Ketones Urine Trace (*)     Specific Gravity Urine 1.017      Blood Urine Negative      pH Urine 7.5 (*)     Protein Albumin Urine Negative      Urobilinogen Urine Normal      Nitrite Urine Negative      Leukocyte Esterase Urine Trace (*)     Mucus Urine Present (*)     Amorphous Crystals Urine Few (*)     RBC Urine 0      WBC Urine 7 (*)     Squamous Epithelials Urine <1     CBC WITH PLATELETS AND DIFFERENTIAL - Abnormal    WBC Count 4.8      RBC Count 3.38 (*)     Hemoglobin 10.5 (*)     Hematocrit 32.8 (*)     MCV 97      MCH 31.1      MCHC 32.0      RDW 13.3      Platelet Count 271      % Neutrophils 52      % Lymphocytes 33      % Monocytes 12      Mids % (Monos, Eos, Basos)        % Eosinophils 2      % Basophils 1      % Immature Granulocytes 0      NRBCs per 100 WBC 0      Absolute Neutrophils 2.5      Absolute Lymphocytes 1.6      Absolute Monocytes 0.6      Mids Abs (Monos, Eos, Basos)        Absolute Eosinophils 0.1      Absolute Basophils 0.1      Absolute Immature Granulocytes 0.0      Absolute NRBCs 0.0        Emergency Department  Course & Assessments:    Interventions:  Medications - No data to display     Assessments:  1805 I obtained the history and examined the patient as detailed above.   2025 I rechecked patient.   2159 I rechecked patient.     Independent Interpretation (X-rays, CTs, rhythm strip):  None    Consultations/Discussion of Management or Tests:  None     Social Determinants of Health affecting care:   None    Disposition:  The patient was discharged to home.     Impression & Plan      Medical Decision Making:  Kavita Navarro is a 88 year old female who presents to the emergency department with concerns about legs buckling occasionally when she walks.  This been occurring over the past month.  She is on anticoagulants.  She denies falling or hitting her head.  She does not use a walker or any other assistive devices.  She lives independently.  She does have poor short-term memory.  Laboratory work-up here is reassuring.  No convincing evidence of urinary tract infection.  She not having any symptoms of UTI.  Laboratory work-up otherwise does not show any significant abnormalities that would explain her legs buckling.  CT scan of her brain was negative for acute findings.  Overall work-up is reassuring.  She and son would like to be discharged home.  They will be planning on finding some alternate living situation for her.  Recommended continued follow-up with PCP.  Return with any new or worrisome symptoms.    Diagnosis:    ICD-10-CM    1. Generalized weakness  R53.1       2. Anemia, unspecified type  D64.9            Discharge Medications:  Discharge Medication List as of 10/12/2023 10:26 PM         Scribe Disclosure:  I, Conner Lizarraga, am serving as a scribe at 8:37 PM on 10/12/2023 to document services personally performed by Andrzej Santillan MD based on my observations and the provider's statements to me.   10/12/2023   Andrzej Santillan MD [place], Aaron Joseph, MD  10/13/23 0151

## 2024-03-27 ENCOUNTER — LAB REQUISITION (OUTPATIENT)
Dept: LAB | Facility: CLINIC | Age: 89
End: 2024-03-27
Payer: MEDICARE

## 2024-03-27 DIAGNOSIS — R39.15 URGENCY OF URINATION: ICD-10-CM

## 2024-03-27 DIAGNOSIS — R35.0 FREQUENCY OF MICTURITION: ICD-10-CM

## 2024-03-27 LAB
ALBUMIN UR-MCNC: 10 MG/DL
APPEARANCE UR: ABNORMAL
BILIRUB UR QL STRIP: NEGATIVE
CAOX CRY #/AREA URNS HPF: ABNORMAL /HPF
COLOR UR AUTO: ABNORMAL
GLUCOSE UR STRIP-MCNC: NEGATIVE MG/DL
HGB UR QL STRIP: NEGATIVE
KETONES UR STRIP-MCNC: NEGATIVE MG/DL
LEUKOCYTE ESTERASE UR QL STRIP: ABNORMAL
MUCOUS THREADS #/AREA URNS LPF: PRESENT /LPF
NITRATE UR QL: POSITIVE
PH UR STRIP: 5 [PH] (ref 5–7)
RBC URINE: 4 /HPF
SP GR UR STRIP: 1.03 (ref 1–1.03)
UROBILINOGEN UR STRIP-MCNC: NORMAL MG/DL
WBC URINE: 0 /HPF

## 2024-03-27 PROCEDURE — 87186 SC STD MICRODIL/AGAR DIL: CPT | Mod: ORL | Performed by: NURSE PRACTITIONER

## 2024-03-27 PROCEDURE — 87086 URINE CULTURE/COLONY COUNT: CPT | Mod: ORL | Performed by: NURSE PRACTITIONER

## 2024-03-27 PROCEDURE — 81001 URINALYSIS AUTO W/SCOPE: CPT | Mod: ORL | Performed by: NURSE PRACTITIONER

## 2024-03-29 LAB — BACTERIA UR CULT: ABNORMAL

## 2024-07-16 ENCOUNTER — LAB REQUISITION (OUTPATIENT)
Dept: LAB | Facility: CLINIC | Age: 89
End: 2024-07-16
Payer: MEDICARE

## 2024-07-16 DIAGNOSIS — E43 UNSPECIFIED SEVERE PROTEIN-CALORIE MALNUTRITION (H): ICD-10-CM

## 2024-07-17 LAB
BASOPHILS # BLD AUTO: 0.1 10E3/UL (ref 0–0.2)
BASOPHILS NFR BLD AUTO: 0 %
EOSINOPHIL # BLD AUTO: 0.1 10E3/UL (ref 0–0.7)
EOSINOPHIL NFR BLD AUTO: 0 %
ERYTHROCYTE [DISTWIDTH] IN BLOOD BY AUTOMATED COUNT: 19.2 % (ref 10–15)
HCT VFR BLD AUTO: 30 % (ref 35–47)
HGB BLD-MCNC: 8.9 G/DL (ref 11.7–15.7)
IMM GRANULOCYTES # BLD: 0.1 10E3/UL
IMM GRANULOCYTES NFR BLD: 1 %
LYMPHOCYTES # BLD AUTO: 2.3 10E3/UL (ref 0.8–5.3)
LYMPHOCYTES NFR BLD AUTO: 12 %
MCH RBC QN AUTO: 21.9 PG (ref 26.5–33)
MCHC RBC AUTO-ENTMCNC: 29.7 G/DL (ref 31.5–36.5)
MCV RBC AUTO: 74 FL (ref 78–100)
MONOCYTES # BLD AUTO: 1.7 10E3/UL (ref 0–1.3)
MONOCYTES NFR BLD AUTO: 9 %
NEUTROPHILS # BLD AUTO: 15.5 10E3/UL (ref 1.6–8.3)
NEUTROPHILS NFR BLD AUTO: 78 %
NRBC # BLD AUTO: 0 10E3/UL
NRBC BLD AUTO-RTO: 0 /100
PLATELET # BLD AUTO: 509 10E3/UL (ref 150–450)
RBC # BLD AUTO: 4.06 10E6/UL (ref 3.8–5.2)
WBC # BLD AUTO: 19.8 10E3/UL (ref 4–11)

## 2024-07-17 PROCEDURE — P9603 ONE-WAY ALLOW PRORATED MILES: HCPCS | Mod: ORL | Performed by: NURSE PRACTITIONER

## 2024-07-17 PROCEDURE — 36415 COLL VENOUS BLD VENIPUNCTURE: CPT | Mod: ORL | Performed by: NURSE PRACTITIONER

## 2024-07-17 PROCEDURE — 80053 COMPREHEN METABOLIC PANEL: CPT | Mod: ORL | Performed by: NURSE PRACTITIONER

## 2024-07-17 PROCEDURE — 85025 COMPLETE CBC W/AUTO DIFF WBC: CPT | Mod: ORL | Performed by: NURSE PRACTITIONER

## 2024-07-17 PROCEDURE — 84443 ASSAY THYROID STIM HORMONE: CPT | Mod: ORL | Performed by: NURSE PRACTITIONER

## 2024-07-17 PROCEDURE — 83690 ASSAY OF LIPASE: CPT | Mod: ORL | Performed by: NURSE PRACTITIONER

## 2024-07-18 LAB
ALBUMIN SERPL BCG-MCNC: 2.9 G/DL (ref 3.5–5.2)
ALP SERPL-CCNC: 97 U/L (ref 40–150)
ALT SERPL W P-5'-P-CCNC: 8 U/L (ref 0–50)
ANION GAP SERPL CALCULATED.3IONS-SCNC: 9 MMOL/L (ref 7–15)
AST SERPL W P-5'-P-CCNC: 44 U/L (ref 0–45)
BILIRUB SERPL-MCNC: <0.2 MG/DL
BUN SERPL-MCNC: 17.1 MG/DL (ref 8–23)
CALCIUM SERPL-MCNC: 8.5 MG/DL (ref 8.8–10.4)
CHLORIDE SERPL-SCNC: 100 MMOL/L (ref 98–107)
CREAT SERPL-MCNC: 0.62 MG/DL (ref 0.51–0.95)
EGFRCR SERPLBLD CKD-EPI 2021: 85 ML/MIN/1.73M2
GLUCOSE SERPL-MCNC: 115 MG/DL (ref 70–99)
HCO3 SERPL-SCNC: 26 MMOL/L (ref 22–29)
LIPASE SERPL-CCNC: 20 U/L (ref 13–60)
POTASSIUM SERPL-SCNC: 4.3 MMOL/L (ref 3.4–5.3)
PROT SERPL-MCNC: 6.2 G/DL (ref 6.4–8.3)
SODIUM SERPL-SCNC: 135 MMOL/L (ref 135–145)
TSH SERPL DL<=0.005 MIU/L-ACNC: 2.48 UIU/ML (ref 0.3–4.2)